# Patient Record
Sex: MALE | Race: WHITE | NOT HISPANIC OR LATINO | Employment: OTHER | URBAN - METROPOLITAN AREA
[De-identification: names, ages, dates, MRNs, and addresses within clinical notes are randomized per-mention and may not be internally consistent; named-entity substitution may affect disease eponyms.]

---

## 2023-02-28 LAB
ALANINE AMINOTRANSFERASE (SGPT) (U/L) IN SER/PLAS: 21 U/L (ref 10–52)
ALBUMIN (G/DL) IN SER/PLAS: 4.3 G/DL (ref 3.4–5)
ALKALINE PHOSPHATASE (U/L) IN SER/PLAS: 58 U/L (ref 33–120)
ANION GAP IN SER/PLAS: 12 MMOL/L (ref 10–20)
ASPARTATE AMINOTRANSFERASE (SGOT) (U/L) IN SER/PLAS: 18 U/L (ref 9–39)
BASOPHILS (10*3/UL) IN BLOOD BY AUTOMATED COUNT: 0.1 X10E9/L (ref 0–0.1)
BASOPHILS/100 LEUKOCYTES IN BLOOD BY AUTOMATED COUNT: 0.9 % (ref 0–2)
BILIRUBIN TOTAL (MG/DL) IN SER/PLAS: 0.6 MG/DL (ref 0–1.2)
CALCIDIOL (25 OH VITAMIN D3) (NG/ML) IN SER/PLAS: 36 NG/ML
CALCIUM (MG/DL) IN SER/PLAS: 9.3 MG/DL (ref 8.6–10.3)
CARBON DIOXIDE, TOTAL (MMOL/L) IN SER/PLAS: 27 MMOL/L (ref 21–32)
CHLORIDE (MMOL/L) IN SER/PLAS: 108 MMOL/L (ref 98–107)
CHOLESTEROL (MG/DL) IN SER/PLAS: 156 MG/DL (ref 0–199)
CHOLESTEROL IN HDL (MG/DL) IN SER/PLAS: 39.2 MG/DL
CHOLESTEROL/HDL RATIO: 4
CREATININE (MG/DL) IN SER/PLAS: 0.95 MG/DL (ref 0.5–1.3)
EOSINOPHILS (10*3/UL) IN BLOOD BY AUTOMATED COUNT: 0.62 X10E9/L (ref 0–0.7)
EOSINOPHILS/100 LEUKOCYTES IN BLOOD BY AUTOMATED COUNT: 5.6 % (ref 0–6)
ERYTHROCYTE DISTRIBUTION WIDTH (RATIO) BY AUTOMATED COUNT: 13.6 % (ref 11.5–14.5)
ERYTHROCYTE MEAN CORPUSCULAR HEMOGLOBIN CONCENTRATION (G/DL) BY AUTOMATED: 34.2 G/DL (ref 32–36)
ERYTHROCYTE MEAN CORPUSCULAR VOLUME (FL) BY AUTOMATED COUNT: 87 FL (ref 80–100)
ERYTHROCYTES (10*6/UL) IN BLOOD BY AUTOMATED COUNT: 4.9 X10E12/L (ref 4.5–5.9)
GFR MALE: >90 ML/MIN/1.73M2
GLUCOSE (MG/DL) IN SER/PLAS: 94 MG/DL (ref 74–99)
HEMATOCRIT (%) IN BLOOD BY AUTOMATED COUNT: 42.4 % (ref 41–52)
HEMOGLOBIN (G/DL) IN BLOOD: 14.5 G/DL (ref 13.5–17.5)
IMMATURE GRANULOCYTES/100 LEUKOCYTES IN BLOOD BY AUTOMATED COUNT: 0.9 % (ref 0–0.9)
LDL: 93 MG/DL (ref 0–99)
LEUKOCYTES (10*3/UL) IN BLOOD BY AUTOMATED COUNT: 11.1 X10E9/L (ref 4.4–11.3)
LYMPHOCYTES (10*3/UL) IN BLOOD BY AUTOMATED COUNT: 5.06 X10E9/L (ref 1.2–4.8)
LYMPHOCYTES/100 LEUKOCYTES IN BLOOD BY AUTOMATED COUNT: 45.6 % (ref 13–44)
MONOCYTES (10*3/UL) IN BLOOD BY AUTOMATED COUNT: 0.94 X10E9/L (ref 0.1–1)
MONOCYTES/100 LEUKOCYTES IN BLOOD BY AUTOMATED COUNT: 8.5 % (ref 2–10)
NEUTROPHILS (10*3/UL) IN BLOOD BY AUTOMATED COUNT: 4.28 X10E9/L (ref 1.2–7.7)
NEUTROPHILS/100 LEUKOCYTES IN BLOOD BY AUTOMATED COUNT: 38.5 % (ref 40–80)
PLATELETS (10*3/UL) IN BLOOD AUTOMATED COUNT: 201 X10E9/L (ref 150–450)
POTASSIUM (MMOL/L) IN SER/PLAS: 3.8 MMOL/L (ref 3.5–5.3)
PROSTATE SPECIFIC ANTIGEN,SCREEN: 0.64 NG/ML (ref 0–4)
PROTEIN TOTAL: 6.8 G/DL (ref 6.4–8.2)
SODIUM (MMOL/L) IN SER/PLAS: 143 MMOL/L (ref 136–145)
TRIGLYCERIDE (MG/DL) IN SER/PLAS: 119 MG/DL (ref 0–149)
UREA NITROGEN (MG/DL) IN SER/PLAS: 20 MG/DL (ref 6–23)
VLDL: 24 MG/DL (ref 0–40)

## 2023-03-31 DIAGNOSIS — I10 BENIGN ESSENTIAL HTN: Primary | ICD-10-CM

## 2023-03-31 RX ORDER — AMLODIPINE BESYLATE 5 MG/1
TABLET ORAL
Qty: 30 TABLET | Refills: 1 | Status: SHIPPED | OUTPATIENT
Start: 2023-03-31 | End: 2023-06-23

## 2023-05-22 ENCOUNTER — APPOINTMENT (OUTPATIENT)
Dept: PRIMARY CARE | Facility: CLINIC | Age: 58
End: 2023-05-22
Payer: MEDICARE

## 2023-05-23 PROBLEM — E78.5 HYPERLIPIDEMIA: Status: ACTIVE | Noted: 2023-05-23

## 2023-05-23 PROBLEM — M26.609 TMJ (TEMPOROMANDIBULAR JOINT SYNDROME): Status: ACTIVE | Noted: 2023-05-23

## 2023-05-23 PROBLEM — F34.1 DYSTHYMIC DISORDER: Status: ACTIVE | Noted: 2023-05-23

## 2023-05-23 PROBLEM — R35.0 URINARY FREQUENCY: Status: ACTIVE | Noted: 2023-05-23

## 2023-05-23 PROBLEM — N32.89 BLADDER WALL THICKENING: Status: ACTIVE | Noted: 2023-05-23

## 2023-05-23 PROBLEM — H10.9 CONJUNCTIVITIS: Status: ACTIVE | Noted: 2023-05-23

## 2023-05-23 PROBLEM — F33.1 MAJOR DEPRESSIVE DISORDER, RECURRENT EPISODE, MODERATE DEGREE (MULTI): Status: ACTIVE | Noted: 2023-05-23

## 2023-05-23 PROBLEM — I10 BENIGN ESSENTIAL HYPERTENSION: Status: ACTIVE | Noted: 2023-05-23

## 2023-05-23 PROBLEM — M25.552 HIP PAIN, LEFT: Status: ACTIVE | Noted: 2023-05-23

## 2023-05-23 PROBLEM — K59.09 CHRONIC CONSTIPATION: Status: ACTIVE | Noted: 2023-05-23

## 2023-05-23 PROBLEM — C91.10 CLL (CHRONIC LYMPHOCYTIC LEUKEMIA) (MULTI): Status: ACTIVE | Noted: 2023-05-23

## 2023-05-23 PROBLEM — F98.4 REPETITIVE STEREOTYPED MOVEMENT: Status: ACTIVE | Noted: 2023-05-23

## 2023-05-23 PROBLEM — J32.9 SINUSITIS: Status: ACTIVE | Noted: 2023-05-23

## 2023-05-23 PROBLEM — D72.829 LEUKOCYTOSIS: Status: ACTIVE | Noted: 2023-05-23

## 2023-05-23 PROBLEM — F29 PSYCHOSIS (MULTI): Status: ACTIVE | Noted: 2023-05-23

## 2023-05-23 PROBLEM — R79.89 ABNORMAL CBC: Status: ACTIVE | Noted: 2023-05-23

## 2023-05-23 PROBLEM — N40.0 BPH WITHOUT URINARY OBSTRUCTION: Status: ACTIVE | Noted: 2023-05-23

## 2023-05-23 PROBLEM — F84.0 AUTISM SPECTRUM DISORDER (HHS-HCC): Status: ACTIVE | Noted: 2023-05-23

## 2023-05-23 PROBLEM — K21.9 GASTROESOPHAGEAL REFLUX DISEASE: Status: ACTIVE | Noted: 2023-05-23

## 2023-05-23 PROBLEM — G47.00 INSOMNIA: Status: ACTIVE | Noted: 2023-05-23

## 2023-05-23 PROBLEM — R39.9 UTI SYMPTOMS: Status: ACTIVE | Noted: 2023-05-23

## 2023-05-23 PROBLEM — Q64.79 STRUCTURAL ABNORMALITY OF BLADDER: Status: ACTIVE | Noted: 2023-05-23

## 2023-05-23 PROBLEM — R63.4 UNINTENTIONAL WEIGHT LOSS: Status: ACTIVE | Noted: 2023-05-23

## 2023-05-23 PROBLEM — E55.9 VITAMIN D DEFICIENCY: Status: ACTIVE | Noted: 2023-05-23

## 2023-05-23 PROBLEM — F71 MODERATE INTELLECTUAL DISABILITY: Status: ACTIVE | Noted: 2023-05-23

## 2023-05-26 PROBLEM — R91.1 LUNG NODULE: Status: ACTIVE | Noted: 2023-05-26

## 2023-05-26 PROBLEM — R21 RASH: Status: ACTIVE | Noted: 2023-05-26

## 2023-05-30 ENCOUNTER — OFFICE VISIT (OUTPATIENT)
Dept: PRIMARY CARE | Facility: CLINIC | Age: 58
End: 2023-05-30
Payer: MEDICARE

## 2023-05-30 VITALS
WEIGHT: 190.8 LBS | DIASTOLIC BLOOD PRESSURE: 68 MMHG | HEART RATE: 76 BPM | SYSTOLIC BLOOD PRESSURE: 108 MMHG | HEIGHT: 67 IN | BODY MASS INDEX: 29.95 KG/M2 | OXYGEN SATURATION: 94 % | TEMPERATURE: 97.6 F

## 2023-05-30 DIAGNOSIS — K21.9 GASTROESOPHAGEAL REFLUX DISEASE, UNSPECIFIED WHETHER ESOPHAGITIS PRESENT: ICD-10-CM

## 2023-05-30 DIAGNOSIS — G89.29 CHRONIC RIGHT SHOULDER PAIN: Primary | ICD-10-CM

## 2023-05-30 DIAGNOSIS — I10 BENIGN ESSENTIAL HYPERTENSION: ICD-10-CM

## 2023-05-30 DIAGNOSIS — K59.09 CHRONIC CONSTIPATION: ICD-10-CM

## 2023-05-30 DIAGNOSIS — R91.1 LUNG NODULE: ICD-10-CM

## 2023-05-30 DIAGNOSIS — E55.9 VITAMIN D DEFICIENCY: ICD-10-CM

## 2023-05-30 DIAGNOSIS — E78.5 HYPERLIPIDEMIA, UNSPECIFIED HYPERLIPIDEMIA TYPE: ICD-10-CM

## 2023-05-30 DIAGNOSIS — G47.00 INSOMNIA, UNSPECIFIED TYPE: ICD-10-CM

## 2023-05-30 DIAGNOSIS — F29 PSYCHOSIS, UNSPECIFIED PSYCHOSIS TYPE (MULTI): ICD-10-CM

## 2023-05-30 DIAGNOSIS — M25.511 CHRONIC RIGHT SHOULDER PAIN: Primary | ICD-10-CM

## 2023-05-30 DIAGNOSIS — R21 RASH: ICD-10-CM

## 2023-05-30 DIAGNOSIS — F84.0 AUTISM SPECTRUM DISORDER (HHS-HCC): ICD-10-CM

## 2023-05-30 DIAGNOSIS — F33.1 MAJOR DEPRESSIVE DISORDER, RECURRENT EPISODE, MODERATE DEGREE (MULTI): ICD-10-CM

## 2023-05-30 DIAGNOSIS — F71 MODERATE INTELLECTUAL DISABILITY: ICD-10-CM

## 2023-05-30 DIAGNOSIS — N40.0 BPH WITHOUT URINARY OBSTRUCTION: ICD-10-CM

## 2023-05-30 PROBLEM — K02.9 CHRONIC DENTAL CARIES EXTENDING TO PULP: Status: ACTIVE | Noted: 2020-01-13

## 2023-05-30 PROCEDURE — 3074F SYST BP LT 130 MM HG: CPT | Performed by: STUDENT IN AN ORGANIZED HEALTH CARE EDUCATION/TRAINING PROGRAM

## 2023-05-30 PROCEDURE — 1036F TOBACCO NON-USER: CPT | Performed by: STUDENT IN AN ORGANIZED HEALTH CARE EDUCATION/TRAINING PROGRAM

## 2023-05-30 PROCEDURE — 3078F DIAST BP <80 MM HG: CPT | Performed by: STUDENT IN AN ORGANIZED HEALTH CARE EDUCATION/TRAINING PROGRAM

## 2023-05-30 PROCEDURE — 99214 OFFICE O/P EST MOD 30 MIN: CPT | Performed by: STUDENT IN AN ORGANIZED HEALTH CARE EDUCATION/TRAINING PROGRAM

## 2023-05-30 RX ORDER — LATANOPROST 50 UG/ML
1 SOLUTION/ DROPS OPHTHALMIC
COMMUNITY

## 2023-05-30 RX ORDER — SIMVASTATIN 20 MG/1
20 TABLET, FILM COATED ORAL DAILY
COMMUNITY
Start: 2008-05-13 | End: 2023-12-10

## 2023-05-30 RX ORDER — CHOLECALCIFEROL (VITAMIN D3) 25 MCG
1000 TABLET ORAL
COMMUNITY
End: 2023-07-20

## 2023-05-30 RX ORDER — POLYETHYLENE GLYCOL 3350 17 G/17G
17 POWDER, FOR SOLUTION ORAL
COMMUNITY
Start: 2022-03-09 | End: 2023-10-10 | Stop reason: SDUPTHER

## 2023-05-30 RX ORDER — TRAZODONE HYDROCHLORIDE 100 MG/1
100 TABLET ORAL NIGHTLY
COMMUNITY
Start: 2020-10-06 | End: 2024-02-05 | Stop reason: SDUPTHER

## 2023-05-30 RX ORDER — ACETAMINOPHEN 500 MG
5 TABLET ORAL
COMMUNITY
Start: 2016-05-23 | End: 2024-02-05 | Stop reason: SDUPTHER

## 2023-05-30 RX ORDER — ERYTHROMYCIN 5 MG/G
OINTMENT OPHTHALMIC
COMMUNITY

## 2023-05-30 RX ORDER — DORZOLAMIDE HYDROCHLORIDE AND TIMOLOL MALEATE 20; 5 MG/ML; MG/ML
1 SOLUTION/ DROPS OPHTHALMIC
COMMUNITY
Start: 2023-05-26

## 2023-05-30 RX ORDER — OMEPRAZOLE 40 MG/1
40 CAPSULE, DELAYED RELEASE ORAL
COMMUNITY
Start: 2010-05-11

## 2023-05-30 RX ORDER — METOPROLOL TARTRATE 50 MG/1
50 TABLET ORAL
COMMUNITY
Start: 2007-04-03 | End: 2023-10-20

## 2023-05-30 RX ORDER — OLANZAPINE 15 MG/1
15 TABLET ORAL NIGHTLY
COMMUNITY
Start: 2023-03-02 | End: 2023-10-11 | Stop reason: SDUPTHER

## 2023-05-30 RX ORDER — TAMSULOSIN HYDROCHLORIDE 0.4 MG/1
0.4 CAPSULE ORAL DAILY
COMMUNITY
Start: 2020-11-18 | End: 2024-01-05 | Stop reason: SDUPTHER

## 2023-05-30 ASSESSMENT — ENCOUNTER SYMPTOMS
VOMITING: 0
DIAPHORESIS: 0
CHILLS: 0
NAUSEA: 0
LIGHT-HEADEDNESS: 0
SHORTNESS OF BREATH: 0
FEVER: 0
DYSURIA: 0
DIARRHEA: 0
ABDOMINAL PAIN: 0
DIZZINESS: 0

## 2023-05-30 ASSESSMENT — PATIENT HEALTH QUESTIONNAIRE - PHQ9
2. FEELING DOWN, DEPRESSED OR HOPELESS: NOT AT ALL
SUM OF ALL RESPONSES TO PHQ9 QUESTIONS 1 AND 2: 0
1. LITTLE INTEREST OR PLEASURE IN DOING THINGS: NOT AT ALL

## 2023-05-30 NOTE — PROGRESS NOTES
"Subjective   Patient ID: Ede Zurita is a 57 y.o. male who presents for Follow-up.  He is having right shoulder pain for the last few weeks. No injury or falls recently. Feels like a sharp needle pain. Pain is worse with leaning on arm in bed and changing shirts. Crossing arm over chest worsens the pain. Steroids help the pain some.         Review of Systems   Constitutional:  Negative for chills, diaphoresis and fever.   HENT:  Negative for hearing loss.    Eyes:  Negative for visual disturbance.   Respiratory:  Negative for shortness of breath.    Cardiovascular:  Negative for chest pain.   Gastrointestinal:  Negative for abdominal pain, diarrhea, nausea and vomiting.   Genitourinary:  Negative for dysuria.   Neurological:  Negative for dizziness and light-headedness.       /68   Pulse 76   Temp 36.4 °C (97.6 °F)   Ht 1.689 m (5' 6.5\")   Wt 86.5 kg (190 lb 12.8 oz)   SpO2 94%   BMI 30.33 kg/m²     Objective   Physical Exam  Vitals reviewed.   Constitutional:       General: He is not in acute distress.     Appearance: Normal appearance.   HENT:      Head: Normocephalic.   Cardiovascular:      Rate and Rhythm: Normal rate and regular rhythm.   Pulmonary:      Effort: Pulmonary effort is normal. No respiratory distress.      Breath sounds: Normal breath sounds.   Abdominal:      General: There is no distension.   Musculoskeletal:         General: No deformity.      Comments: Right shoulder pain worse with crossing arm over chest. Patient unable to complete apley scratch test on right due to pain.    Skin:     Coloration: Skin is not jaundiced.   Neurological:      Mental Status: He is alert.         Assessment/Plan   Problem List Items Addressed This Visit          Nervous    Insomnia       Respiratory    Lung nodule       Circulatory    Benign essential hypertension       Digestive    Chronic constipation    Gastroesophageal reflux disease       Genitourinary    BPH without urinary obstruction       " Endocrine/Metabolic    Vitamin D deficiency       Other    Autism spectrum disorder    Hyperlipidemia    Major depressive disorder, recurrent episode, moderate degree (CMS/HCC)    Moderate intellectual disability    Psychosis (CMS/HCC)    Rash     Other Visit Diagnoses       Chronic right shoulder pain    -  Primary    Relevant Orders    Referral to Orthopaedic Surgery          Shoulder pain: Possible AC joint issue, referred to ortho    Rash: trial steroid cream x 2 weeks and resolved     Constipation: on colace  -seeing GI     Unintentional weight loss: new diagnosis of CLL  -seeing heme and having workup--was advised not from CLL  -had recent c-scope, CT head, CT C/A/P  -weight improved, still using boost.  -seeing urology for bladder abnormality--advised continue f/u with urology     Lung nodule, 5mm on CT , may need repeat this fall.     left hip pain: neg and resolved     Hypertriglyceridemia: Improved on lipid panel in      GERD: resolved with stopping ASA and higher PPI  -they saw [facility] had normal EGD recently  -avoid nsaids     Urinary urgency: UA normal  -PSA WNL  -on flomax  -Continue f/u with urology     Hypertension: controlled with norvasc and metoprolol     Hyperlipidemia: on simvastatin, TG getting better (lipids done )     Moderate intellectual disability/dysthymic disorder/ ASD/stereotypic movement disorder  -sees / Angel  -on trazodone, olanzapine, melatonin     Insomnia: see above     Hx of gastric polyps on EGD 5/3/19     f/u 6 months Dr. Sherman.  ENT: Dr. Rowe  Eye doc: Dr. Tomas  GI: Dr. Saroj Sun     Health Maintenance  -Prostate Cancer Screenin/22 WNL  -Colonoscopy:3/15/22-repeat 5 years ([facility])  -Vaccinations: Advised flu shot--they think had but waiting to hear from house. Pneumovax UTD. UTD Covid and shingrix     Lives at [facility]

## 2023-06-29 ENCOUNTER — OFFICE VISIT (OUTPATIENT)
Dept: PRIMARY CARE | Facility: CLINIC | Age: 58
End: 2023-06-29
Payer: MEDICARE

## 2023-06-29 VITALS
TEMPERATURE: 97.9 F | HEART RATE: 75 BPM | HEIGHT: 67 IN | WEIGHT: 195 LBS | OXYGEN SATURATION: 95 % | DIASTOLIC BLOOD PRESSURE: 69 MMHG | SYSTOLIC BLOOD PRESSURE: 103 MMHG | BODY MASS INDEX: 30.61 KG/M2

## 2023-06-29 DIAGNOSIS — L03.211 CELLULITIS, FACE: Primary | ICD-10-CM

## 2023-06-29 PROBLEM — F41.9 ANXIETY: Status: ACTIVE | Noted: 2019-08-09

## 2023-06-29 PROBLEM — M25.561 ACUTE PAIN OF RIGHT KNEE: Status: ACTIVE | Noted: 2019-04-19

## 2023-06-29 PROBLEM — F89 DEVELOPMENTAL DISABILITY: Status: ACTIVE | Noted: 2020-05-15

## 2023-06-29 PROBLEM — I10 HYPERTENSION: Status: ACTIVE | Noted: 2020-05-15

## 2023-06-29 PROBLEM — S16.1XXA CERVICAL STRAIN: Status: ACTIVE | Noted: 2019-02-27

## 2023-06-29 PROBLEM — F98.4 STEREOTYPIC MOVEMENT DISORDER: Status: ACTIVE | Noted: 2019-04-19

## 2023-06-29 PROBLEM — L30.9 ECZEMA: Status: ACTIVE | Noted: 2019-04-19

## 2023-06-29 PROBLEM — M54.2 NECK PAIN: Status: ACTIVE | Noted: 2019-02-27

## 2023-06-29 PROCEDURE — 1036F TOBACCO NON-USER: CPT | Performed by: STUDENT IN AN ORGANIZED HEALTH CARE EDUCATION/TRAINING PROGRAM

## 2023-06-29 PROCEDURE — 3074F SYST BP LT 130 MM HG: CPT | Performed by: STUDENT IN AN ORGANIZED HEALTH CARE EDUCATION/TRAINING PROGRAM

## 2023-06-29 PROCEDURE — 3078F DIAST BP <80 MM HG: CPT | Performed by: STUDENT IN AN ORGANIZED HEALTH CARE EDUCATION/TRAINING PROGRAM

## 2023-06-29 PROCEDURE — 99213 OFFICE O/P EST LOW 20 MIN: CPT | Performed by: STUDENT IN AN ORGANIZED HEALTH CARE EDUCATION/TRAINING PROGRAM

## 2023-06-29 RX ORDER — CEPHALEXIN 500 MG/1
500 CAPSULE ORAL 4 TIMES DAILY
Qty: 28 CAPSULE | Refills: 0 | Status: SHIPPED | OUTPATIENT
Start: 2023-06-29 | End: 2023-07-06

## 2023-06-29 RX ORDER — CEPHALEXIN 500 MG/1
500 CAPSULE ORAL 4 TIMES DAILY
Qty: 28 CAPSULE | Refills: 0 | Status: SHIPPED | OUTPATIENT
Start: 2023-06-29 | End: 2023-06-29 | Stop reason: SDUPTHER

## 2023-06-29 ASSESSMENT — ENCOUNTER SYMPTOMS
VOMITING: 0
SHORTNESS OF BREATH: 0
DIZZINESS: 0
CHILLS: 0
NAUSEA: 0
EYE PAIN: 0
LIGHT-HEADEDNESS: 0
DIAPHORESIS: 0
FEVER: 0

## 2023-06-29 NOTE — ASSESSMENT & PLAN NOTE
Symptoms most consistent with right face cellulitis.  Will empirically treat with another round of Keflex.  Derm if no better.  No signs of periorbital or orbital cellulitis.  Advised to caregiver to let us know if rash is worsening, rash close to the eyes as may need ER evaluation.  Follow-up with Dr. Sherman as scheduled later this year or sooner if needed.

## 2023-06-29 NOTE — PROGRESS NOTES
"Subjective   Patient ID: Ede Zurita is a 57 y.o. male who presents for Rash and Leg Swelling.  He is here for rash and leg swelling. Here with Lynette who is a residential coordination. Rash was on the right side of the face and slightly erythematous but spread throughout. It is patchy on the right side of the face. He went to [facility] urgent care for this and prescribed bacitracin and keflex x1 week for suspected skin infection. Rash seemed to improve but then worsened. He tends to rub soft things on his face such as pollen. Rash present for 1 month, improved with abx but then recurred and seems to be slightly improving today. Rash is not painful and is itchy.        Review of Systems   Constitutional:  Negative for chills, diaphoresis and fever.   HENT:  Negative for ear pain and hearing loss.    Eyes:  Negative for pain and visual disturbance.   Respiratory:  Negative for shortness of breath.    Gastrointestinal:  Negative for nausea and vomiting.   Neurological:  Negative for dizziness and light-headedness.       /69   Pulse 75   Temp 36.6 °C (97.9 °F)   Ht 1.689 m (5' 6.5\")   Wt 88.5 kg (195 lb)   SpO2 95%   BMI 31.00 kg/m²     Objective   Physical Exam  Vitals reviewed.   Constitutional:       General: He is not in acute distress.     Appearance: Normal appearance.   HENT:      Head: Normocephalic.   Eyes:      Extraocular Movements: Extraocular movements intact.   Cardiovascular:      Rate and Rhythm: Normal rate and regular rhythm.   Pulmonary:      Effort: Pulmonary effort is normal. No respiratory distress.      Breath sounds: Normal breath sounds.   Abdominal:      General: There is no distension.   Musculoskeletal:         General: No deformity.   Skin:     Coloration: Skin is not jaundiced.      Comments: Mild erythema present over the right anterior/ temporal face approximately 6 to 8 cm in diameter, not warm, no crepitus.  No erythema around the eyes bilaterally.  No scaling, no vesicles " present.   Neurological:      Mental Status: He is alert.         Assessment/Plan   Problem List Items Addressed This Visit       Cellulitis, face - Primary     Symptoms most consistent with right face cellulitis.  Will empirically treat with another round of Keflex.  Derm if no better.  No signs of periorbital or orbital cellulitis.  Advised to caregiver to let us know if rash is worsening, rash close to the eyes as may need ER evaluation.  Follow-up with Dr. Sherman as scheduled later this year or sooner if needed.         Relevant Medications    cephalexin (Keflex) 500 mg capsule

## 2023-07-20 DIAGNOSIS — E55.9 VITAMIN D DEFICIENCY: Primary | ICD-10-CM

## 2023-07-20 DIAGNOSIS — F33.1 MAJOR DEPRESSIVE DISORDER, RECURRENT EPISODE, MODERATE DEGREE (MULTI): Primary | ICD-10-CM

## 2023-07-20 RX ORDER — CHOLECALCIFEROL (VITAMIN D3) 25 MCG
25 TABLET ORAL DAILY
Qty: 30 TABLET | Refills: 1 | Status: SHIPPED | OUTPATIENT
Start: 2023-07-20 | End: 2023-10-13

## 2023-07-20 RX ORDER — ASPIRIN 325 MG
1 TABLET ORAL DAILY
Qty: 30 EACH | Refills: 4 | Status: SHIPPED | OUTPATIENT
Start: 2023-07-20 | End: 2024-01-04

## 2023-08-14 ENCOUNTER — TELEPHONE (OUTPATIENT)
Dept: PRIMARY CARE | Facility: CLINIC | Age: 58
End: 2023-08-14
Payer: COMMERCIAL

## 2023-08-14 NOTE — TELEPHONE ENCOUNTER
Trish called and states that they need to know if pt is taking mirilax twice day or once daily.    They believe it is supposed to be once a daily but need to make sure.    Please advise.

## 2023-08-18 DIAGNOSIS — I10 BENIGN ESSENTIAL HTN: ICD-10-CM

## 2023-08-18 RX ORDER — AMLODIPINE BESYLATE 5 MG/1
TABLET ORAL
Qty: 30 TABLET | Refills: 10 | Status: SHIPPED | OUTPATIENT
Start: 2023-08-18

## 2023-10-06 DIAGNOSIS — F29 PSYCHOSIS, UNSPECIFIED PSYCHOSIS TYPE (MULTI): ICD-10-CM

## 2023-10-09 ENCOUNTER — TELEPHONE (OUTPATIENT)
Dept: OTHER | Age: 58
End: 2023-10-09
Payer: COMMERCIAL

## 2023-10-10 ENCOUNTER — TELEPHONE (OUTPATIENT)
Dept: PRIMARY CARE | Facility: CLINIC | Age: 58
End: 2023-10-10
Payer: COMMERCIAL

## 2023-10-10 DIAGNOSIS — K59.00 CONSTIPATION, UNSPECIFIED CONSTIPATION TYPE: Primary | ICD-10-CM

## 2023-10-10 RX ORDER — POLYETHYLENE GLYCOL 3350 17 G/17G
17 POWDER, FOR SOLUTION ORAL DAILY
Qty: 510 G | Refills: 11 | Status: SHIPPED | OUTPATIENT
Start: 2023-10-10 | End: 2024-10-04

## 2023-10-10 NOTE — TELEPHONE ENCOUNTER
Trish with [facility] called and was requesting a refill on Miralax. She stated that she was unsure if this was last prescribed by you. Please advise - accuscripts

## 2023-10-11 ENCOUNTER — TELEPHONE (OUTPATIENT)
Dept: BEHAVIORAL HEALTH | Facility: CLINIC | Age: 58
End: 2023-10-11
Payer: COMMERCIAL

## 2023-10-11 DIAGNOSIS — F29 PSYCHOSIS, UNSPECIFIED PSYCHOSIS TYPE (MULTI): ICD-10-CM

## 2023-10-11 RX ORDER — OLANZAPINE 15 MG/1
15 TABLET ORAL NIGHTLY
Qty: 30 TABLET | Refills: 3 | Status: SHIPPED | OUTPATIENT
Start: 2023-10-11 | End: 2024-05-09 | Stop reason: WASHOUT

## 2023-10-13 DIAGNOSIS — K59.09 CHRONIC CONSTIPATION: Primary | ICD-10-CM

## 2023-10-13 DIAGNOSIS — F41.1 GAD (GENERALIZED ANXIETY DISORDER): ICD-10-CM

## 2023-10-13 DIAGNOSIS — E55.9 VITAMIN D DEFICIENCY: ICD-10-CM

## 2023-10-13 RX ORDER — DOCUSATE SODIUM 100 MG/1
CAPSULE, LIQUID FILLED ORAL
Qty: 30 CAPSULE | Refills: 10 | Status: SHIPPED | OUTPATIENT
Start: 2023-10-13

## 2023-10-13 RX ORDER — CHOLECALCIFEROL (VITAMIN D3) 25 MCG
25 TABLET ORAL DAILY
Qty: 30 TABLET | Refills: 10 | Status: SHIPPED | OUTPATIENT
Start: 2023-10-13

## 2023-10-20 RX ORDER — ESCITALOPRAM OXALATE 10 MG/1
TABLET ORAL
Qty: 30 TABLET | Refills: 10 | Status: SHIPPED | OUTPATIENT
Start: 2023-10-20 | End: 2023-11-13 | Stop reason: SDUPTHER

## 2023-10-20 RX ORDER — OLANZAPINE 15 MG/1
15 TABLET ORAL NIGHTLY
Qty: 30 TABLET | Refills: 6 | Status: SHIPPED | OUTPATIENT
Start: 2023-10-20 | End: 2024-02-05 | Stop reason: SDUPTHER

## 2023-10-20 RX ORDER — METOPROLOL TARTRATE 50 MG/1
TABLET ORAL
Qty: 60 TABLET | Refills: 10 | Status: SHIPPED | OUTPATIENT
Start: 2023-10-20 | End: 2024-02-05 | Stop reason: SDUPTHER

## 2023-10-30 ENCOUNTER — APPOINTMENT (OUTPATIENT)
Dept: BEHAVIORAL HEALTH | Facility: CLINIC | Age: 58
End: 2023-10-30
Payer: COMMERCIAL

## 2023-11-09 ENCOUNTER — LAB (OUTPATIENT)
Dept: LAB | Facility: LAB | Age: 58
End: 2023-11-09
Payer: MEDICARE

## 2023-11-09 ENCOUNTER — OFFICE VISIT (OUTPATIENT)
Dept: PRIMARY CARE | Facility: CLINIC | Age: 58
End: 2023-11-09
Payer: MEDICARE

## 2023-11-09 VITALS
HEIGHT: 67 IN | OXYGEN SATURATION: 93 % | WEIGHT: 206.6 LBS | BODY MASS INDEX: 32.43 KG/M2 | DIASTOLIC BLOOD PRESSURE: 64 MMHG | TEMPERATURE: 97.7 F | HEART RATE: 72 BPM | SYSTOLIC BLOOD PRESSURE: 98 MMHG

## 2023-11-09 DIAGNOSIS — Z12.5 SCREENING FOR PROSTATE CANCER: ICD-10-CM

## 2023-11-09 DIAGNOSIS — L71.0 PERIORAL DERMATITIS: ICD-10-CM

## 2023-11-09 DIAGNOSIS — E78.5 HYPERLIPIDEMIA, UNSPECIFIED HYPERLIPIDEMIA TYPE: ICD-10-CM

## 2023-11-09 DIAGNOSIS — R91.1 LUNG NODULE: ICD-10-CM

## 2023-11-09 DIAGNOSIS — Z00.00 ROUTINE GENERAL MEDICAL EXAMINATION AT HEALTH CARE FACILITY: Primary | ICD-10-CM

## 2023-11-09 DIAGNOSIS — R39.9 UTI SYMPTOMS: ICD-10-CM

## 2023-11-09 DIAGNOSIS — I10 BENIGN ESSENTIAL HYPERTENSION: ICD-10-CM

## 2023-11-09 DIAGNOSIS — C91.10 CLL (CHRONIC LYMPHOCYTIC LEUKEMIA) (MULTI): ICD-10-CM

## 2023-11-09 PROBLEM — L03.211 CELLULITIS, FACE: Status: RESOLVED | Noted: 2023-06-29 | Resolved: 2023-11-09

## 2023-11-09 PROBLEM — H10.9 CONJUNCTIVITIS: Status: RESOLVED | Noted: 2023-05-23 | Resolved: 2023-11-09

## 2023-11-09 PROBLEM — R63.4 UNINTENTIONAL WEIGHT LOSS: Status: RESOLVED | Noted: 2023-05-23 | Resolved: 2023-11-09

## 2023-11-09 PROBLEM — F34.1 DYSTHYMIC DISORDER: Status: RESOLVED | Noted: 2023-05-23 | Resolved: 2023-11-09

## 2023-11-09 PROBLEM — J32.9 SINUSITIS: Status: RESOLVED | Noted: 2023-05-23 | Resolved: 2023-11-09

## 2023-11-09 PROBLEM — L03.211 CELLULITIS DIFFUSE, FACE: Status: RESOLVED | Noted: 2023-06-29 | Resolved: 2023-11-09

## 2023-11-09 LAB
APPEARANCE UR: CLEAR
BILIRUB UR STRIP.AUTO-MCNC: NEGATIVE MG/DL
COLOR UR: YELLOW
GLUCOSE UR STRIP.AUTO-MCNC: NEGATIVE MG/DL
KETONES UR STRIP.AUTO-MCNC: NEGATIVE MG/DL
LEUKOCYTE ESTERASE UR QL STRIP.AUTO: NEGATIVE
NITRITE UR QL STRIP.AUTO: NEGATIVE
PH UR STRIP.AUTO: 6 [PH]
PROT UR STRIP.AUTO-MCNC: NEGATIVE MG/DL
RBC # UR STRIP.AUTO: NEGATIVE /UL
SP GR UR STRIP.AUTO: 1.02
UROBILINOGEN UR STRIP.AUTO-MCNC: <2 MG/DL

## 2023-11-09 PROCEDURE — G0439 PPPS, SUBSEQ VISIT: HCPCS | Performed by: INTERNAL MEDICINE

## 2023-11-09 PROCEDURE — 99213 OFFICE O/P EST LOW 20 MIN: CPT | Performed by: INTERNAL MEDICINE

## 2023-11-09 PROCEDURE — 87086 URINE CULTURE/COLONY COUNT: CPT

## 2023-11-09 PROCEDURE — 3078F DIAST BP <80 MM HG: CPT | Performed by: INTERNAL MEDICINE

## 2023-11-09 PROCEDURE — 3074F SYST BP LT 130 MM HG: CPT | Performed by: INTERNAL MEDICINE

## 2023-11-09 PROCEDURE — 1036F TOBACCO NON-USER: CPT | Performed by: INTERNAL MEDICINE

## 2023-11-09 PROCEDURE — 81003 URINALYSIS AUTO W/O SCOPE: CPT

## 2023-11-09 RX ORDER — PIMECROLIMUS 10 MG/G
CREAM TOPICAL
Qty: 60 G | Refills: 1 | Status: SHIPPED | OUTPATIENT
Start: 2023-11-09

## 2023-11-09 RX ORDER — ACETAMINOPHEN 325 MG/1
650 TABLET ORAL EVERY 6 HOURS PRN
COMMUNITY

## 2023-11-09 ASSESSMENT — ENCOUNTER SYMPTOMS
ABDOMINAL PAIN: 0
SHORTNESS OF BREATH: 0
RHINORRHEA: 0
BLOOD IN STOOL: 0
EYE PAIN: 0
SORE THROAT: 0
WOUND: 0
POLYPHAGIA: 0
CHILLS: 0
MYALGIAS: 0
CHEST TIGHTNESS: 0
POLYDIPSIA: 0
FREQUENCY: 0
WHEEZING: 0
ARTHRALGIAS: 0
UNEXPECTED WEIGHT CHANGE: 0
DIARRHEA: 0
CONSTIPATION: 0
DIZZINESS: 0
COUGH: 0
FEVER: 0
VOMITING: 0
HEADACHES: 0
NERVOUS/ANXIOUS: 0
PALPITATIONS: 0
NAUSEA: 0
DYSPHORIC MOOD: 0
DYSURIA: 0
HEMATURIA: 0

## 2023-11-09 ASSESSMENT — ACTIVITIES OF DAILY LIVING (ADL)
DOING_HOUSEWORK: INDEPENDENT
BATHING: INDEPENDENT
MANAGING_FINANCES: NEEDS ASSISTANCE
GROCERY_SHOPPING: NEEDS ASSISTANCE
DRESSING: INDEPENDENT
TAKING_MEDICATION: NEEDS ASSISTANCE

## 2023-11-09 ASSESSMENT — PATIENT HEALTH QUESTIONNAIRE - PHQ9
SUM OF ALL RESPONSES TO PHQ9 QUESTIONS 1 AND 2: 0
1. LITTLE INTEREST OR PLEASURE IN DOING THINGS: NOT AT ALL
2. FEELING DOWN, DEPRESSED OR HOPELESS: NOT AT ALL

## 2023-11-09 NOTE — PATIENT INSTRUCTIONS
I recommend Ede to see dermatology with [facility] for rash  Start pimecrolimus to rash around mouth.    Ede is due for blood before next visit. It is fasting    He is also due for his lung scan this month. This is ordered    I ordered urine test to make sure there is not a UTI.    Followup 6 months    COVID booster at the pharmacy

## 2023-11-09 NOTE — PROGRESS NOTES
"Subjective   Reason for Visit: Ede Zurita is an 58 y.o. male here for a Medicare Wellness visit.     Past Medical, Surgical, and Family History reviewed and updated in chart.    Reviewed all medications by prescribing practitioner or clinical pharmacist (such as prescriptions, OTCs, herbal therapies and supplements) and documented in the medical record.    HPI    Here for wellness  Ede states he feels fine and no concerns  Weight is up  Welcome staff checks BP daily -120-130s/70-80s  No lows at home  Feels well    Concerned for rash around eyes and mouth. Ede states no itching or pain. Saw eye doc and given erythromycin and has not helped much. No oozing    His weight is up    Staff states new strong odor to urine. Started yesterday    Patient Care Team:  Iza Sherman MD as PCP - General  Iza Sherman MD as PCP - [facility] Attributed Provider     Review of Systems   Constitutional:  Negative for chills, fever and unexpected weight change.   HENT:  Negative for congestion, hearing loss, rhinorrhea and sore throat.    Eyes:  Negative for pain and visual disturbance.   Respiratory:  Negative for cough, chest tightness, shortness of breath and wheezing.    Cardiovascular:  Negative for chest pain and palpitations.   Gastrointestinal:  Negative for abdominal pain, blood in stool, constipation, diarrhea, nausea and vomiting.   Endocrine: Negative for cold intolerance, heat intolerance, polydipsia and polyphagia.   Genitourinary:  Negative for dysuria, frequency and hematuria.   Musculoskeletal:  Negative for arthralgias and myalgias.   Skin:  Positive for rash. Negative for wound.   Neurological:  Negative for dizziness, syncope and headaches.   Psychiatric/Behavioral:  Negative for dysphoric mood. The patient is not nervous/anxious.        Objective   Vitals:  BP 98/64   Pulse 72   Temp 36.5 °C (97.7 °F)   Ht 1.689 m (5' 6.5\")   Wt 93.7 kg (206 lb 9.6 oz)   SpO2 93%   BMI 32.85 kg/m²       Physical Exam  Vitals " reviewed.   Constitutional:       Appearance: Normal appearance. He is not ill-appearing.   HENT:      Head: Normocephalic and atraumatic.      Right Ear: Tympanic membrane normal.      Left Ear: Tympanic membrane normal.      Nose: Nose normal.      Mouth/Throat:      Mouth: Mucous membranes are dry.      Pharynx: Oropharynx is clear.   Eyes:      Extraocular Movements: Extraocular movements intact.      Conjunctiva/sclera: Conjunctivae normal.      Pupils: Pupils are equal, round, and reactive to light.   Cardiovascular:      Rate and Rhythm: Normal rate and regular rhythm.   Pulmonary:      Effort: Pulmonary effort is normal.      Breath sounds: Normal breath sounds. No wheezing.   Abdominal:      General: There is no distension.      Palpations: Abdomen is soft. There is no mass.      Tenderness: There is no abdominal tenderness.   Musculoskeletal:         General: No swelling.      Cervical back: Neck supple.   Lymphadenopathy:      Cervical: No cervical adenopathy.   Skin:     Comments: Flaky around eye and mouth with papular erythematous rash   Neurological:      General: No focal deficit present.      Mental Status: He is alert.      Gait: Gait normal.   Psychiatric:         Mood and Affect: Mood normal.         Behavior: Behavior normal.         Thought Content: Thought content normal.         Assessment/Plan   Problem List Items Addressed This Visit       Benign essential hypertension    CLL (chronic lymphocytic leukemia) (CMS/HCC)    Relevant Orders    Comprehensive Metabolic Panel    CBC and Auto Differential    Hyperlipidemia    Relevant Orders    Lipid Panel    3 Month Follow Up In [facility] - PCP    UTI symptoms    Relevant Orders    Urinalysis with Reflex Microscopic    Urine Culture    Lung nodule    Relevant Orders    CT chest wo IV contrast     Other Visit Diagnoses       Routine general medical examination at health care facility    -  Primary    Relevant Orders    1 Year Follow Up In  [facility] - PCP - Wellness Exam    Perioral dermatitis        Relevant Medications    pimecrolimus (Elidel) 1 % cream    Screening for prostate cancer        Relevant Orders    Prostate Spec.Ag,Screen           CPE completed.  Advised to keep a heart healthy, low fat diet with fruits and veggies like Mediterranean diet.  Advised on the importance of exercise and maintaining 150 minutes of exercise per week (30 minutes per day 5 days a week).  Advised on regular eye and dental visits.  Discussed age appropriate cancer screening, immunizations and recommendations given.  Discussed avoiding illicit drugs and tobacco. Advised on appropriate use of alcohol.  Advised to wear seat belt.     Rash: appears to be perioral dermatitis  -send to derm  -trial topical     COnstipation: on colace  -seeing GI     Unintentional weight loss: new diagnosis of CLL  -seeing heme and having workup--was advised not from CLL. 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  -had recent c-scope, CT head, CT C/A/P  -weight issues resolved  6/22-  said as needed     Lung nodule 11/22-last checked  -ordered     left hip pain: neg and resolved     Hypertriglyceridemia: ordered     GERD: resolved with stopping ASA and higher PPI  -they saw [facility] had normal EGD recently  -avoid nsaids     Urinary urgency  -PSA WNL  -on flomax  -sees urology    Urine symptoms-check UA and culture     Hypertension: controlled with norvasc and metoprolol     Hyperlipidemia: on simvastatin, TG getting better (lipids done 2/23     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 Labs February Ordered CT. advised to call if weight loss again. look to maintain now  ENT: Dr. Rowe  Eye doc: Dr. Tomas  GI:   [facility]  -Prostate Cancer Screenin/22 WNL  -Colonoscopy:3/15/22-repeat 5 years ([facility])  -Vaccinations: Pneumovax, tdap, shingrix, flu UTRENETTA   Lives at [facility]

## 2023-11-10 DIAGNOSIS — F41.1 GAD (GENERALIZED ANXIETY DISORDER): ICD-10-CM

## 2023-11-10 LAB — BACTERIA UR CULT: NO GROWTH

## 2023-11-13 DIAGNOSIS — F41.1 GAD (GENERALIZED ANXIETY DISORDER): ICD-10-CM

## 2023-11-13 RX ORDER — ESCITALOPRAM OXALATE 10 MG/1
10 TABLET ORAL NIGHTLY
Qty: 30 TABLET | Refills: 6 | Status: SHIPPED | OUTPATIENT
Start: 2023-11-13 | End: 2024-05-09 | Stop reason: WASHOUT

## 2023-11-15 RX ORDER — ESCITALOPRAM OXALATE 10 MG/1
TABLET ORAL
Qty: 30 TABLET | Refills: 10 | Status: SHIPPED | OUTPATIENT
Start: 2023-11-15 | End: 2024-02-05 | Stop reason: SDUPTHER

## 2023-11-16 ENCOUNTER — TELEPHONE (OUTPATIENT)
Dept: PRIMARY CARE | Facility: CLINIC | Age: 58
End: 2023-11-16
Payer: MEDICARE

## 2023-11-16 NOTE — TELEPHONE ENCOUNTER
Trish called and wanted to know what OTC med/cream they can  for pt since the RX cream is not covered.    Please advise.

## 2023-12-08 DIAGNOSIS — E78.5 HYPERLIPIDEMIA, UNSPECIFIED HYPERLIPIDEMIA TYPE: Primary | ICD-10-CM

## 2023-12-10 RX ORDER — SIMVASTATIN 20 MG/1
20 TABLET, FILM COATED ORAL DAILY
Qty: 30 TABLET | Refills: 10 | Status: SHIPPED | OUTPATIENT
Start: 2023-12-10

## 2023-12-28 ENCOUNTER — TELEPHONE (OUTPATIENT)
Dept: BEHAVIORAL HEALTH | Facility: CLINIC | Age: 58
End: 2023-12-28
Payer: MEDICARE

## 2023-12-28 DIAGNOSIS — F42.2 MIXED OBSESSIONAL THOUGHTS AND ACTS: ICD-10-CM

## 2023-12-28 RX ORDER — ESCITALOPRAM OXALATE 20 MG/1
TABLET ORAL
Status: CANCELLED | OUTPATIENT
Start: 2023-12-28

## 2023-12-28 RX ORDER — ESCITALOPRAM OXALATE 20 MG/1
TABLET ORAL
COMMUNITY
Start: 2023-12-07 | End: 2023-12-28 | Stop reason: SDUPTHER

## 2023-12-28 RX ORDER — ESCITALOPRAM OXALATE 20 MG/1
TABLET ORAL
Qty: 30 TABLET | Refills: 6 | Status: SHIPPED | OUTPATIENT
Start: 2023-12-28 | End: 2024-02-05 | Stop reason: SDUPTHER

## 2024-01-04 DIAGNOSIS — F33.1 MAJOR DEPRESSIVE DISORDER, RECURRENT EPISODE, MODERATE DEGREE (MULTI): ICD-10-CM

## 2024-01-04 RX ORDER — ASPIRIN 325 MG
1 TABLET ORAL DAILY
Qty: 2 EACH | Refills: 10 | Status: SHIPPED | OUTPATIENT
Start: 2024-01-04 | End: 2024-01-05 | Stop reason: SDUPTHER

## 2024-01-05 DIAGNOSIS — F33.1 MAJOR DEPRESSIVE DISORDER, RECURRENT EPISODE, MODERATE DEGREE (MULTI): ICD-10-CM

## 2024-01-05 DIAGNOSIS — N40.0 BPH WITHOUT URINARY OBSTRUCTION: ICD-10-CM

## 2024-01-05 RX ORDER — ASPIRIN 325 MG
1 TABLET ORAL DAILY
Qty: 30 EACH | Refills: 10 | Status: SHIPPED | OUTPATIENT
Start: 2024-01-05

## 2024-01-08 RX ORDER — TAMSULOSIN HYDROCHLORIDE 0.4 MG/1
0.4 CAPSULE ORAL NIGHTLY
Qty: 90 CAPSULE | Refills: 3 | Status: SHIPPED | OUTPATIENT
Start: 2024-01-08 | End: 2024-01-22 | Stop reason: SDUPTHER

## 2024-01-22 ENCOUNTER — OFFICE VISIT (OUTPATIENT)
Dept: UROLOGY | Facility: CLINIC | Age: 59
End: 2024-01-22
Payer: MEDICARE

## 2024-01-22 VITALS
HEIGHT: 67 IN | SYSTOLIC BLOOD PRESSURE: 116 MMHG | DIASTOLIC BLOOD PRESSURE: 73 MMHG | HEART RATE: 82 BPM | TEMPERATURE: 97.9 F | BODY MASS INDEX: 32.8 KG/M2 | WEIGHT: 209 LBS

## 2024-01-22 DIAGNOSIS — N40.0 BPH WITHOUT URINARY OBSTRUCTION: ICD-10-CM

## 2024-01-22 DIAGNOSIS — R32 URINARY INCONTINENCE, UNSPECIFIED TYPE: ICD-10-CM

## 2024-01-22 DIAGNOSIS — N32.89 BLADDER WALL THICKENING: Primary | ICD-10-CM

## 2024-01-22 PROBLEM — Q64.79 STRUCTURAL ABNORMALITY OF BLADDER: Status: RESOLVED | Noted: 2023-05-23 | Resolved: 2024-01-22

## 2024-01-22 PROCEDURE — 3078F DIAST BP <80 MM HG: CPT | Performed by: UROLOGY

## 2024-01-22 PROCEDURE — 3074F SYST BP LT 130 MM HG: CPT | Performed by: UROLOGY

## 2024-01-22 PROCEDURE — 99213 OFFICE O/P EST LOW 20 MIN: CPT | Performed by: UROLOGY

## 2024-01-22 PROCEDURE — 1036F TOBACCO NON-USER: CPT | Performed by: UROLOGY

## 2024-01-22 RX ORDER — DIAPER,BRIEF,ADULT, DISPOSABLE
1 EACH MISCELLANEOUS AS NEEDED
Qty: 96 EACH | Refills: 11 | Status: SHIPPED | OUTPATIENT
Start: 2024-01-22

## 2024-01-22 RX ORDER — TAMSULOSIN HYDROCHLORIDE 0.4 MG/1
0.4 CAPSULE ORAL NIGHTLY
Qty: 90 CAPSULE | Refills: 3 | Status: SHIPPED | OUTPATIENT
Start: 2024-01-22

## 2024-01-22 NOTE — PROGRESS NOTES
PRIOR NOTES  58-year-old male referred to me for an incidental abnormal CT scan of the bladder  Here today with his caretaker, lives in group home  Kindly referred by Dr. Iza Sherman  PMH: Hypertension, hyperlipidemia, intellectual disability, CLL, unexplained weight loss  PSH: Cholecystectomy  May have increased urinary frequency but caretakers at his facility not familiar with any issues  Pt does not report any LUTS but somewhat difficult to tell w/ his disability  CT C/A/P with IV contrast 09/21: Bilateral very small renal lesions too small to characterize likely cysts. No nephrolithiasis. Bladder was decompressed on the scan and not well evaluated  CT done at [facility] abdomen/pelvis 03/22: Bladder wall thickening, under distended, prostate measured at 6.1 x 3.7 cm  PSA 01/22: 0.80  UA negative for blood     FUV 1/23/23 -Per his caregiver, he is waking up 1-2 times a night. Per Ede, he is able to be with a good stream, feels he is emptying his bladder okay.  RBUS 11/29/22 - Grossly normal bladder. PVR 36cc.  PSA 02/23 - 0.64     UPDATED SUBJECTIVE HISTORY  01/22/24 - Pt here with caregiver for LUTS. Needs tamsulosin refill. Caregiver requesting large depends in case of accidents.   NTF 1x.     Past Medical History  He has no past medical history on file.    Surgical History  He has a past surgical history that includes Other surgical history (10/14/2020); Other surgical history (10/14/2020); Other surgical history (10/14/2020); and Other surgical history (10/14/2020).     Social History  He reports that he has never smoked. He has never been exposed to tobacco smoke. He has never used smokeless tobacco. He reports that he does not drink alcohol and does not use drugs.    Family History  No family history on file.     Allergies  Patient has no known allergies.    ROS: 12 system review was completed and is negative with the exception of those signs and symptoms noted in the history of present illness: A 12  "system review was completed and is negative with the exception of those signs and symptoms noted in the history of present illness.     Exam:  General: in NAD, appears stated age  Head: normocephalic, atraumatic  Respiratory: normal effort, no use of accessory muscles  Cardiovascular: no edema noted  Skin: normal turgor, no rashes  Neurologic: grossly intact, oriented to person/place/time  Psychiatric: mode and affect appropriate     Last Recorded Vitals  Blood pressure 116/73, pulse 82, temperature 36.6 °C (97.9 °F), height 1.689 m (5' 6.5\"), weight 94.8 kg (209 lb).    Lab Results   Component Value Date    PSASCREEN 0.64 02/28/2023    CREATININE 0.95 02/28/2023    HGB 14.5 02/28/2023         ASSESSMENT/PLAN:  # Enlarged prostate with lower urinary tract symptoms  -Well-controlled on tamsulosin 0.4 mg nightly, 1 year of refills given  -Nocturnal frequency as appropriate  -Bladder wall thickening secondary to BPH  -Prescription for large depends given    From here, given that he has no active urologic issues and is on Flomax only I recommended he follow-up with Dr. Sherman with return visit to me on an as-needed basis for any new issues    Teodoro Min MD    "

## 2024-02-05 ENCOUNTER — TELEMEDICINE (OUTPATIENT)
Dept: BEHAVIORAL HEALTH | Facility: CLINIC | Age: 59
End: 2024-02-05
Payer: MEDICARE

## 2024-02-05 DIAGNOSIS — F51.05 INSOMNIA DUE TO OTHER MENTAL DISORDER: ICD-10-CM

## 2024-02-05 DIAGNOSIS — F29 PSYCHOSIS, UNSPECIFIED PSYCHOSIS TYPE (MULTI): ICD-10-CM

## 2024-02-05 DIAGNOSIS — Z79.899 ENCOUNTER FOR LONG-TERM (CURRENT) USE OF HIGH-RISK MEDICATION: ICD-10-CM

## 2024-02-05 DIAGNOSIS — F42.2 MIXED OBSESSIONAL THOUGHTS AND ACTS: ICD-10-CM

## 2024-02-05 DIAGNOSIS — F99 INSOMNIA DUE TO OTHER MENTAL DISORDER: ICD-10-CM

## 2024-02-05 DIAGNOSIS — F41.1 GAD (GENERALIZED ANXIETY DISORDER): ICD-10-CM

## 2024-02-05 PROCEDURE — 99215 OFFICE O/P EST HI 40 MIN: CPT | Performed by: PSYCHIATRY & NEUROLOGY

## 2024-02-05 PROCEDURE — 1036F TOBACCO NON-USER: CPT | Performed by: PSYCHIATRY & NEUROLOGY

## 2024-02-05 RX ORDER — TRAZODONE HYDROCHLORIDE 100 MG/1
100 TABLET ORAL NIGHTLY
Qty: 30 TABLET | Refills: 6 | Status: SHIPPED | OUTPATIENT
Start: 2024-02-05

## 2024-02-05 RX ORDER — METOPROLOL TARTRATE 50 MG/1
TABLET ORAL
Qty: 60 TABLET | Refills: 6 | Status: SHIPPED | OUTPATIENT
Start: 2024-02-05

## 2024-02-05 RX ORDER — OLANZAPINE 15 MG/1
15 TABLET ORAL NIGHTLY
Qty: 30 TABLET | Refills: 6 | Status: SHIPPED | OUTPATIENT
Start: 2024-02-05

## 2024-02-05 RX ORDER — ACETAMINOPHEN 500 MG
TABLET ORAL
Qty: 60 TABLET | Refills: 6 | Status: SHIPPED | OUTPATIENT
Start: 2024-02-05

## 2024-02-05 RX ORDER — ESCITALOPRAM OXALATE 10 MG/1
TABLET ORAL
Qty: 30 TABLET | Refills: 6 | Status: SHIPPED | OUTPATIENT
Start: 2024-02-05 | End: 2024-05-09 | Stop reason: WASHOUT

## 2024-02-05 RX ORDER — ESCITALOPRAM OXALATE 20 MG/1
TABLET ORAL
Qty: 30 TABLET | Refills: 6 | Status: SHIPPED | OUTPATIENT
Start: 2024-02-05

## 2024-02-05 NOTE — PROGRESS NOTES
"Patient Discussion/Summary    Both patient and staff caregivers were provided information re medications, including benefits, potential risks, and expected and unexpected side effects.      Provider Impressions      DIAGNOSES            Dysthymic disorder              Major depression recurrent              Autism spectrum disorder              Stereotypic Movement disorder              ID moderate     Psychosis      PRESENT FOR APPOINTMENT                  Patient              Residential Caregiver Eduarda Roblero, knows him for last nearly 3 years    Chief Complaint    An interactive audio and video telecommunication system which permits real time communications between the patient (at the originating site) and provider (at the distant site) was utilized to provide this telehealth service.      \"He has been stable\"     SUBJECTIVE      No change in medication at last visit in May 2023.       At previous visit in February 2023, olanzapine dose was decreased from 20 to 15 mg per day, with dosing at bedtime only. Trazodone also decreased from 200 to 100 mg at bedtime.     No other changes in routine medication since his last visit.     Current medications include:    Amlodipine 5 mg in morning  Docusate Na 100 mg per day  Escitalopram 30 mg per day at bedtime  Glycolax 3350 mg twice per day  Melatonin 10 mg at bedtime  Metoprolol 50 mg twice per day (7 am and 5 pm)   MVI  Olanzapine 15 mg at bedtime  Omeprazole 40 mg one per day  Simvastatin 20 mg per day at bedtime  Tamsulosin 0.4 mg in am  Trazodone 100 mg at bedtime    Vitamin D 1000 units per day    Tobradex ear drops weekly    Lorazepam 1 mg prn before dental exams     He is overall stable (improved) since his last visit.          His primary caregiver again notes no recent major issues of concern.   No major behavioral challenges or meltdowns since last visit.   Has one incident last week when he seemed to be escalating to an outburst, but was able to calm " without a meltdown.   Otherwise, his behavior has been non-agitated and not dangerous.        Continues to sleep well at night.   Sometimes seems to have a urinary accident after waking in the morning, but not otherwise.       Remains wider awake in daytime both at home and at his day program.    Has not been reluctant to attend.   Travels to and from day program with  staff.       Sleeping at night from 10 pm until 7 am most days.   Later on weekend mornings.        Fewer recent tangential or provocative questions lately.     No overt aggression of any kind.  Remains cooperative with routine activities at home.     Occasional out of context laughter;  but does not appear manic.     Pacing less.       There has not been SIB as previously seen. No reported crying spells..     He continues to eat well, all meals and snacks, and he has gained weight since last visit.    No organized exercise.    Eats all meals, and multiple daily snacks.     No recent vomiting, no choking.     No recent talk of death or suicide.     No urinary accidents at night.    Has been asked to wear Attends but has resisted.     He is excited about planned visit (with his brother) to sister in [place] this weekend.  Will be gone for 10 days.    REVIEW OF SYMPTOMS      Recent rash around eyes, seen by Dermatologist and treated with steroid cream, resolved.      No other recent rashes.        No recent reports of shoulder pain.   Was able to raise both arms above head today on request with no apparent pain.       No interim bowel accidents over last 3 months.    However, he sometimes seems to engage in rectal plan, will be found with stool on hands and on walls of bathroom.   Does not appear constipated, and no reported loose stools or diarrhea.     Has been seen in the past by Heme-Onc at . No interim visits. Per their last note, he is no longer thought to have diagnosis of chronic lymphocytic leukemia. His previous weight loss was not thought to  be related to CLL or other blood disease. Had previously lost about 60 pounds over 3 years, but weight has stabilized and increased over the last several months.   Most recent weight is 206 pounds, up from 191 pounds in May 2023.      Interim dental visits with Dr. Jones.      Seen in the past in Urology, surrounding apparent bladder wall thickening. No change in therapy planned. Recommended bladder US in 6 months.   Occasional morning  bladder accidents.     No other interim medical issues or visits.     No interim visits with GI Dr. Sun at [facility].     ENT visit for ear cleaning in interim    No other medical issues in interim.  PCP is Dr. Sherman, had visit in Nov 2023.          PAIN      No recent reports of pain.  No observed limp or difficulty walking lately.                  MEDICAL PROBLEMS      PCP at  is Dr. Sherman              SIDE EFFECTS           Pacing is significantly decreased since last medication changes.   No falls, no active tremor, and no drooling.     CHANGE IN TREATMENT          None at last visit.  On previous visit, both olanzapine and trazodone decreased.              COMPLIANCE           Compliance good.        MENTAL STATUS EVALUATION         Per video contact:.    alert     overall cooperative. He was able to sit for an exam.     neat, well groomed              BEHAVIOR            Little gaze avoidance today    Reciprocal interaction     Spontaneous speech      Not at all agitated during exam.     MEMORY            Remembered 3 of 3 objects shown and then hidden after 10 minutes              SENSORY            Normal              COMMUNICATION           Conversational     Speech still stilted/Robotic; less so.              AFFECT         Slightly flat; not at all irritable today.             MOOD            Not overtly sad              THOUGHT PROCESS          [place]     Perseverative                THOUGHT CONTENT          obsessive                PSYCHOTIC SYMPTOMS          None seen  today during exam, but some self talk               ORIENTATION           x3 knew day of week, and month, and year      CONCENTRATION           Less distracted today       CALCULATION           Single digit addition only       FUND OF KNOWLEDGE          moderate disability        JUDGMENT            posed situations     GAIT             Slow and somewhat rolling gait.    Can rise to stand without apparent pain or dizziness.       Gait seems more normal today              EPS             Did not see tremor or drooling today     AIMS             Did not see any abnormal facial or other movements.       LABS REVIEWED    November 2023:    UA normal    February 2023:    CBC CMP and lipids all normal. Vit D 36. PSA normal     June 2022:    CBC and CMP normal. Folate and B12 levels normal. Vitamin D 42. Escitalopram level 89 (range 21-64 for doses 10-30 mg per day)    January 2022:    CMP, TSH, PSA normal.Vit D 42 (normal)    June 2021:    WBC 12K on repeat CBC. Some immature cells.     Feb 2021:    WBC 14.3, PSA 1.4, glucose (appears fasting) 105, rest of CMP normal. HDL 25, and .     December 2019:    CMP, TSH, PSA, Mg all normal. Hct 40.8; rest of CBC grossly normal. TG elevated. HgbA1C normal at 5.1. Vitamin D 46.     July 2018:    Escitalopram level 110. Range is 21-64 for doses 10-30 mg per day.     December 2017:    CBC, CMP, TSH, HgbA1C all normal. Folate 20, B12 845, Vit D 26.8 (low) Cholesterol normal, TG elevated    November 2016:    CBC and CMP grossly normal. , and Vitamin D low end of range.     March 2016:    CBC normal, CMP normal, TG elevated, TSH normal. Vitamin D low             December 2014:    CBC normal. CMP normal. TG slightly elevated. Gabapentin level 5.2 (4-16).     November 2013, CBC, CMP, TSH, and T4 all normal.     EKG    None in interim    July 2022:    NSR, rate 64 bpm, QTc 412 msec    Feb 2021:    NSR, rate 88 bpm, QTc 423 msec.     December 2019:    NSR, rate 97 beats per  minute. QTc 449 msec.     July 2018:    NSR rate 86 bpm; QTc 442 msec, normal EKG    November 2016:    NSR, QTc 415, possible L atrial enlargment.     March 2016:    NSR Rate 83 bpm, QTc 444 msec.              May 2014:    NSR, rate 75 bpm, QTc 418 msec.     In June 2013, EKG showed NSR, rate 87 bpm. Qtc 442 msec. Possible atrial enlargement.     DEMENTIA SCREEN:    NTG-EDSD completed in Oct 2021 scored 22, and repeated in March 2022 scored 16. Both are in the range of concern for a dementia process. However, active psychosis or other major psychiatric illness can cause false positive results.      ASSESSMENT            Remains overall improved since last visit.     No new mood symptoms.   Remains wider awake through the day, and still sleeping ok at night.     Weight has increased in interim, up > 10 pounds in last 6 months.  Less restless.    Not much pacing.    Little overall agitation.     No interim labs.      Need to update labs and EKG      Based on general improvement, plan no change in medications this visit.     Discussed plan for transition to new psychiatrist surrounding correction of current clinician.  Discussed with caregiver Ms. Roblero.    PLAN             problems treated     f/u requested to prevent relapse     medications renewed/re-ordered     1. All meds stable this visit.   2. Continue to do frequent VS, and forward results to PCP Dr. Sherman as needed   3. Update labs and EKG this visit.  4.  Need to decrease caloric intake, eliminate snacks (particular double snacks)  4.  Likely transition to Dr. Chandrika Bedolla at  [facility]           TREATMENT TYPE           video-based counseling and coordination of care 9921, 50 minutes with > 50% c/c with staff caregivers, addressing s/s of illness, risk/benefits of medications, side effects, and behavioral approaches to illness.       F/UP INTERNAL           6 months.

## 2024-02-19 ENCOUNTER — LAB (OUTPATIENT)
Dept: LAB | Facility: LAB | Age: 59
End: 2024-02-19
Payer: MEDICARE

## 2024-02-19 DIAGNOSIS — C91.10 CLL (CHRONIC LYMPHOCYTIC LEUKEMIA) (MULTI): ICD-10-CM

## 2024-02-19 DIAGNOSIS — E78.5 HYPERLIPIDEMIA, UNSPECIFIED HYPERLIPIDEMIA TYPE: ICD-10-CM

## 2024-02-19 DIAGNOSIS — Z79.899 ENCOUNTER FOR LONG-TERM (CURRENT) USE OF HIGH-RISK MEDICATION: ICD-10-CM

## 2024-02-19 DIAGNOSIS — Z12.5 SCREENING FOR PROSTATE CANCER: ICD-10-CM

## 2024-02-19 LAB
ALBUMIN SERPL BCP-MCNC: 4.3 G/DL (ref 3.4–5)
ALP SERPL-CCNC: 71 U/L (ref 33–120)
ALT SERPL W P-5'-P-CCNC: 34 U/L (ref 10–52)
ANION GAP SERPL CALC-SCNC: 12 MMOL/L (ref 10–20)
AST SERPL W P-5'-P-CCNC: 25 U/L (ref 9–39)
BASOPHILS # BLD MANUAL: 0.16 X10*3/UL (ref 0–0.1)
BASOPHILS NFR BLD MANUAL: 1 %
BILIRUB SERPL-MCNC: 0.8 MG/DL (ref 0–1.2)
BUN SERPL-MCNC: 12 MG/DL (ref 6–23)
CALCIUM SERPL-MCNC: 9 MG/DL (ref 8.6–10.3)
CHLORIDE SERPL-SCNC: 106 MMOL/L (ref 98–107)
CHOLEST SERPL-MCNC: 144 MG/DL (ref 0–199)
CHOLESTEROL/HDL RATIO: 4.7
CO2 SERPL-SCNC: 28 MMOL/L (ref 21–32)
CREAT SERPL-MCNC: 1.02 MG/DL (ref 0.5–1.3)
EGFRCR SERPLBLD CKD-EPI 2021: 86 ML/MIN/1.73M*2
EOSINOPHIL # BLD MANUAL: 0.62 X10*3/UL (ref 0–0.7)
EOSINOPHIL NFR BLD MANUAL: 4 %
ERYTHROCYTE [DISTWIDTH] IN BLOOD BY AUTOMATED COUNT: 21.1 % (ref 11.5–14.5)
EST. AVERAGE GLUCOSE BLD GHB EST-MCNC: 91 MG/DL
GLUCOSE SERPL-MCNC: 89 MG/DL (ref 74–99)
HBA1C MFR BLD: 4.8 %
HCT VFR BLD AUTO: 34.5 % (ref 41–52)
HDLC SERPL-MCNC: 30.4 MG/DL
HGB BLD-MCNC: 14.2 G/DL (ref 13.5–17.5)
IMM GRANULOCYTES # BLD AUTO: 0.25 X10*3/UL (ref 0–0.7)
IMM GRANULOCYTES NFR BLD AUTO: 1.6 % (ref 0–0.9)
LDLC SERPL CALC-MCNC: 72 MG/DL
LYMPHOCYTES # BLD MANUAL: 7.91 X10*3/UL (ref 1.2–4.8)
LYMPHOCYTES NFR BLD MANUAL: 51 %
MCH RBC QN AUTO: 39.8 PG (ref 26–34)
MCHC RBC AUTO-ENTMCNC: 41.2 G/DL (ref 32–36)
MCV RBC AUTO: 97 FL (ref 80–100)
MONOCYTES # BLD MANUAL: 0.93 X10*3/UL (ref 0.1–1)
MONOCYTES NFR BLD MANUAL: 6 %
MYELOCYTES # BLD MANUAL: 0.16 X10*3/UL
MYELOCYTES NFR BLD MANUAL: 1 %
NEUTS SEG # BLD MANUAL: 5.74 X10*3/UL (ref 1.2–7)
NEUTS SEG NFR BLD MANUAL: 37 %
NON HDL CHOLESTEROL: 114 MG/DL (ref 0–149)
NRBC BLD-RTO: 0 /100 WBCS (ref 0–0)
PLATELET # BLD AUTO: 214 X10*3/UL (ref 150–450)
POLYCHROMASIA BLD QL SMEAR: ABNORMAL
POTASSIUM SERPL-SCNC: 3.9 MMOL/L (ref 3.5–5.3)
PROT SERPL-MCNC: 6.4 G/DL (ref 6.4–8.2)
PSA SERPL-MCNC: 0.79 NG/ML
RBC # BLD AUTO: 3.57 X10*6/UL (ref 4.5–5.9)
RBC MORPH BLD: ABNORMAL
SODIUM SERPL-SCNC: 142 MMOL/L (ref 136–145)
TOTAL CELLS COUNTED BLD: 100
TRIGL SERPL-MCNC: 208 MG/DL (ref 0–149)
TSH SERPL-ACNC: 3.07 MIU/L (ref 0.44–3.98)
VLDL: 42 MG/DL (ref 0–40)
WBC # BLD AUTO: 15.5 X10*3/UL (ref 4.4–11.3)

## 2024-02-19 PROCEDURE — 85060 BLOOD SMEAR INTERPRETATION: CPT | Performed by: INTERNAL MEDICINE

## 2024-02-19 PROCEDURE — 85007 BL SMEAR W/DIFF WBC COUNT: CPT

## 2024-02-19 PROCEDURE — 80061 LIPID PANEL: CPT

## 2024-02-19 PROCEDURE — 85027 COMPLETE CBC AUTOMATED: CPT

## 2024-02-19 PROCEDURE — 80053 COMPREHEN METABOLIC PANEL: CPT

## 2024-02-19 PROCEDURE — 84443 ASSAY THYROID STIM HORMONE: CPT

## 2024-02-19 PROCEDURE — G0103 PSA SCREENING: HCPCS

## 2024-02-19 PROCEDURE — 83036 HEMOGLOBIN GLYCOSYLATED A1C: CPT

## 2024-02-20 LAB — PATH REVIEW-CBC DIFFERENTIAL: NORMAL

## 2024-04-02 ENCOUNTER — OFFICE VISIT (OUTPATIENT)
Dept: BEHAVIORAL HEALTH | Facility: CLINIC | Age: 59
End: 2024-04-02
Payer: MEDICARE

## 2024-04-02 DIAGNOSIS — F32.9 MAJOR DEPRESSIVE DISORDER, REMISSION STATUS UNSPECIFIED, UNSPECIFIED WHETHER RECURRENT: ICD-10-CM

## 2024-04-02 DIAGNOSIS — G47.9 SLEEP DISORDER: ICD-10-CM

## 2024-04-02 DIAGNOSIS — Z86.59 PSYCHIATRIC FOLLOW-UP: ICD-10-CM

## 2024-04-02 DIAGNOSIS — F29 PSYCHOSIS, UNSPECIFIED PSYCHOSIS TYPE (MULTI): ICD-10-CM

## 2024-04-02 DIAGNOSIS — F79 INTELLECTUAL DISABILITY: ICD-10-CM

## 2024-04-02 DIAGNOSIS — F98.4 STEREOTYPIC MOVEMENT DISORDER: ICD-10-CM

## 2024-04-02 DIAGNOSIS — Z09 PSYCHIATRIC FOLLOW-UP: ICD-10-CM

## 2024-04-02 DIAGNOSIS — F84.0 AUTISM SPECTRUM DISORDER (HHS-HCC): ICD-10-CM

## 2024-04-02 PROCEDURE — 99215 OFFICE O/P EST HI 40 MIN: CPT | Performed by: STUDENT IN AN ORGANIZED HEALTH CARE EDUCATION/TRAINING PROGRAM

## 2024-04-02 NOTE — PATIENT INSTRUCTIONS
AFTER VISIT SUMMARY      Date: 4/2/2024  Appointment with Psychiatrist - Dr. Bedolla      Reason for Visit:  -New patient - transition of care from Dr. Ortiz      Discussed during Appointment:   -Sleep, fatigue, household disruption  -Physical health, psychiatric/behavioral symptoms, daily functioning, new issues/concerns     -Treatment plan/management, bloodwork  -Medication      Clinical Impression/Status Update:  Patient appears to be psychiatrically and behaviorally stable. Report of sleep disturbance and agitation due to a night time household disruption by his roommate. Also, report of sluggishness in the morning which predates household disruption. Patient has not had a sleep study done in the past.  Will defer sleep study to primary care and follow up with him after his upcoming PCP appointment.  Will continue to monitor symptoms and make no medication changes today.    -Risk/Benefit assessment:   Medical necessity for continued treatment with psychotropic medication:   There is no report of signs/symptoms consistent with medication-induced impairment in daily functioning. At this time, benefits of medication felt to outweigh potential risks. But we will continue to reassess need for psychotropic medication at regular 3 to 6 month intervals, or sooner as clinically indicated.      #Clinic Policies/Procedures  -Refills  Prescriptions will be sent to your pharmacy with enough refills to last until your next appointment. For established patients, we typically provide 6 refills for regular meds and 3 refills for controlled substances (e.g., ADHD stimulant medication, benzodiazepines such as lorazepam). We will not provide additional refills beyond that without having an appointment. You can schedule an appointment by calling our office at [phone].     -Paperwork Requests (e.g., Expert Eval for guardianship)  We ask that you please schedule an appointment if needing paperwork filled out by the doctor  (e.g., Expert Eval, FMLA form).  Please provide as much information as possible about your request and email the doctor any forms needing to be filled out prior to the appointment.       ===========================  INSTRUCTIONS/RECOMMENDATIONS  ===========================    #Medication   -No medication changes made today  --Continue taking your psych medications the same as usual    --Refills will be sent to your pharmacy      >>For prior authorization issues at the pharmacy -> Call the office and ask to talk to Nurse Rangel.      #Technical Issues with Epic -> Please help us improve the Epic experience  To help identify issues needing to be fixed and improve patient care, please report any issues you experience with Epic or [employer], such as difficulty connecting to video during virtual visits.  [phone]      #Follow-up  --Your next appointment is scheduled for 6/10/2024 at 2:00pm (virtual visit with [facility])    *If having new/worsening symptoms, please call the clinic ([phone]) to discuss being seen sooner   >>If unable to reach the office, send me an email at [email or web address]

## 2024-04-02 NOTE — PROGRESS NOTES
Others Attending Appointment:  -Staff (Joseline)  *Presence necessary as independent historian given patient's intellectual/developmental disability and/or impaired communication abilities        CHIEF COMPLAINT     -New patient evaluation   --For transition of care - from Dr. Ortiz        HISTORY OF PRESENT ILLNESS    #Interval Change/Main concerns reported  Report of sleep disturbance in the setting of household disruption. Otherwise no report of new issues/concerns.        #Sleep  Report of sleep disturbance in the setting of his roommate that causes disruption at night interrupting his sleep. Disruption usually starts between 2 and 4 am on most nights. Patient goes to bed around 10 pm. Without the disruption, patient will usually wake up around 6.30 am. Staff reports that sleep disturbance results in 'agitation' defined by staff as yelling at roommate. Staff reports that prior to sleep being disrupted, patient had tendency for morning fatigue and episodes of incontinence. Has been noted on past visits. Per staff knowledge, has never had a sleep study. Patient reports that he takes naps at the day program but unclear how often and in what duration.       #Mood/Irritability  -Patient reports that he is generally in a happy mood  -Patient does not endorse cardinal signs/symptoms of major depressive disorder.   -No report of significant changes in mood, crying spells, frequent mood swings, or significant irritability.         #Behavior  -No report of physical aggression, significant property destruction, elopement, or self-injurious behavior.   -No report of significant issues with anger or impulse control.      #Anxiety  -No report of excessive worrying, perseverating, or reassurance-seeking behavior.   -No report of significant restlessness, pacing, or other signs suggesting psychomotor agitation.   -No report of signs/symptoms consistent with separation anxiety or panic attacks.      #Psychosis  -Occasional  self-talk, but otherwise no report of yg auditory hallucinations  -No report of behaviors suggestive of paranoia  -No report of yg delusional beliefs  -No report of visual hallucinations, command hallucinations., persecutory delusions, ideas of reference, or grossly disorganized thought pattern/behavior.        #Medication   -Endorses medication adherence.  -No report of concerns for medication side effects.   -No report of significant issues with constipation (beyond baseline chronic constipation), excessive sedation, drooling, dizziness, tremors, rigidity, or shuffling gait.      #Health   Stable  -No report of hospitalizations, ED visits, new/worsening medical issues, or changes in non-psych medication  -Patient maintains regular follow up appointments with other multiple providers for her complex medical co-morbidities. No report of problem-based visits outside of regularly scheduled maintenance.      REVIEW OF SYMPTOMS  -Depression/Mood: as per HPI  -Anxiety: negative  -Psychosis: negative  -Miranda: negative  -ADHD: negative  -ODD: negative  -OCD: negative  -Sexually inappropriate behavior: none reported  -Sleep: see HPI  -Appetite: No issues reported. No report of pica. No changes from baseline  -Medical ROS: no report of difficulty urinating, seizures, rash, polydipsia, or constipation beyond baseline.  *All other systems have been reviewed and are negative for complaint unless otherwise noted above       ------------------------------------------  PSYCHIATRIC/BEHAVIORAL HISTORY  -Prior diagnoses: Moderate ID, Unspecified psychosis, Dysthymic disorder, MDD, ASD, Stereotypic Movement disorder    -Was previously seeing psychiatrist Dr. Ortiz  -Therapist: none   -History of violence: aggression in the past  -No reported history of self-injurious behavior  -No reported history of suicide attempt  -Current psych meds:   --Escitalopram 30 (20 + 10) mg per day at bedtime  --Melatonin 10 mg at  bedtime  --Metoprolol 50 mg bid at 7 am and 5 pm  --Olanzapine 15 mg at bedtime  --Trazodone 100 mg at bedtime  --Lorazepam 1 mg prn before dental exams        DEVELOPMENTAL/FUNCTIONAL HISTORY  -No reported maternal illness, injury, smoking, alcohol, drug use, or other toxic exposures during pregnancy  -Care needs: assistance with iADLS  -Language/Communication abilities: fairly good verbal fluency. Spontaneous speech. Some impairments in pragmatic language abilities.  -Cognitive abilities: moderate ID per previous psychiatrist      SOCIAL HISTORY  -Living situation: in a supported living group home with 3 other guys about the same age as him. Patient reportedly gets along well with them. Has been with [employer] for 20+ years  -Family involvement: Significant involvement. Patient is the oldest sibling. Has 2 brothers and 1 sister. Talks to them on the phone.   -Work/school: attends day program  -Guardianship status: self  -Does not endorse smoking, ETOH, or illicit drug use. No reported history of past substance abuse.  -No reported history of significant trauma   -No reported access to firearms         PAST MEDICAL HISTORY  -Benign essential hypertension  -Hyperlipidemia  -TMJ syndrome  -Chronic dental caries   -Vitamin D deficiency  -Chronic constipation  -GERD  -BPH  -No reported history of seizures  -No reported history of cardiac issues  -Up to date with colonoscopy per staff  -PCP: Iza Sherman MD        CURRENT MEDICATIONS  -Info provided by caregivers        ALLERGIES  -No known drug allergies      PHARMACY  -[facility]        Mental Status Exam:     Appearance: adequate grooming   Build: average  Demeanor: average   Eye Contact: average   Motor Activity: Average, no PMA/PMR.  Musculoskeletal: No TD, tics, or tremor appreciated. AIMS score 0.   Mood: euthymic   Affect: full  Speech: Fluent, spontaneous speech. Fair pragmatic language abilities.  Thought Process: [place]. Generally goal directed.  Associations are logical.  Thought Content: Does not endorse suicidal or homicidal ideation, no delusions elicited.   Thought Perception: Does not endorse auditory or visual hallucinations. Does not appear to be responding to hallucinatory stimuli  Orientation: alert, oriented x 3  Attention/Concentration: normal  Cognition: intellectual disability  Insight: impaired, in setting of intellectual/developmental disability  Judgement: impaired, in setting of intellectual/developmental disability        ------------------------------------------  Risk Assessment:  Patient felt to be at low acute and imminent risk of harm to self and others based on consideration of their risk and protective factors, as well as evaluation of their current clinical condition. However, chronic risk is elevated given past history of aggression towards others. Patient does not currently meet criteria for inpatient psychiatric hospitalization given that they do not exhibit evidence of clinically significant deterioration in baseline psychiatric illness, aggression, self-injury, or ability to care for themselves. There is no evidence that patient's current caregivers/living environment are unable to safely manage patient's behavior. Outpatient management is currently felt to be appropriate and in the best interest of the patient. Overall risk mitigated by psychiatric follow-up care, use of psychotropic medication, 24/7 supervision, and [facility] services. Have engaged patient/caregivers in safety planning. They are aware of emergency psychiatric resources available in the event of an acute psychiatric emergency, such as Mobile Crisis, 911, and local ER.           __________________________  IMPRESSION  New patient evaluation to transition care to this provider for management of psychotropic medication.    Report of sleep disturbance in the setting of nighttime household disruption by roommate. Unfortunately, patient  getting awakened by  roommate is not something likely ameliorated by medication. Otherwise, overall presentation appears consistent with patient's reported psychiatric and behavioral baseline.     Morning sluggishness previously reported has possibly been getting worse since having sleep disrupted.  While he currently does not appear significantly impaired from a functional standpoint, that is certainly a risk for the future. Pre-existing report of daytime sluggishness and presence of risk factors suggest that obstructive sleep apnea is a possibility. Per staff, patient has never had a sleep study. Recommended discussing with PCP at upcoming appointment. If not, in future, may refer to sleep medicine. Will continue current medications and follow up after his PCP appointment in June.      Diagnoses:  -r/o Sleep Disorder  -Psychosis, unspecified  -Dysthymic disorder/MDD  -Autism spectrum disorder  -Stereotypic movement disorder  -Intellectual disability          PLAN / MANAGEMENT / RECOMMENDATIONS               #Visit Complexity  -Visit of high complexity given patient's intellectual/developmental disability and/or impaired communication abilities resulting in need for the presence of a third party (staff) to provide clinical information and history.      #Medication Management  -Continue:  --Escitalopram 30 (20 + 10) mg per day at bedtime  --Melatonin 10 mg at bedtime  --Metoprolol 50 mg bid at 7 am and 5 pm  --Olanzapine 15 mg at bedtime  --Trazodone 100 mg at bedtime  -PRN:   --Lorazepam 1 mg before dental exams      #Medication Considerations  -Medication consent/assent:   Risks, benefits, alternatives, off-label uses, black box warnings, and frequent/important side effects of medications have been discussed with patient/caregiver. To the extent possible, they have voiced understanding and agreement with recommended use of psychotropic medication.     -Medical necessity for continued treatment with psychotropic medication:      []  Symptom reduction        [] Improvement in functioning      [x] Reduce risk of harm to self/others      [x] Maintenance therapy to prevent deterioration in functioning      -Rational for not reducing medication dose at this time:           [x] Significant risk of deterioration in functioning       [x] Concern for elevating risk of harm to self/others      [] Prior dose reduction unsuccessful and/or harmful      [x] Psychiatric/behavioral condition not adequately stabilized/optimized       [] Medication regimen recently modified         -OARRS  --I have personally reviewed patient's OARRS report. I have considered the risks of abuse, dependence, addiction, and diversion and feel that the potential benefits of treatment with a controlled substance currently outweigh the potential risks.      -Risk/Benefit assessment:  There is no report of signs/symptoms consistent with medication-induced impairment in daily functioning. At this time, benefits of medication felt to outweigh potential risks. But we will continue to reassess need for psychotropic medication at regular 3 to 6 month intervals, or sooner as clinically indicated.      #Medication Monitoring Plan  --Labs due: February 2025  --Labs to monitor: CBC, CMP, TSH/T4, lipid panel, Hgb A1c, VPA level, EKG      #MDM/Complexity Issues    [] Chronic medical comorbidities     [] Chronic risk of harm to self/others     [x] Intellectual/Developmental disability     [x] Need for independent historian due to intellectual/developmental disability and/or           impaired communication abilities     [x] Controlled substance medication requiring regular monitoring     [x] Medication requiring significant ongoing monitoring for toxicity     #Counseling Provided     [x] Diagnostic impression/prognosis      [x] Risks and benefits of treatment options     [x] Instruction for management/treatment and follow-up     [x] Educated patient/caregiver about: behavioral interventions,  sleep hygiene, safety planning                  #Coordination of care provided    [] Family    [x] Caregiver/DSP/Staff       []Agency supervisor       [] Nursing staff      [] SSA/          []Therapist                     [] Guardian      #Follow-up       -in about 3 months, or sooner if new/worsening symptoms         >> Scheduled virtual Follow-up Appt on 6/10/2024 at 14:00          Time  -Prep time on date of the patient encounter: 10 minutes  -Time spent directly with patient/family/caregiver: 50 minutes  -Additional time spent on patient care activities: 10 minutes  -Documentation time: 20 minutes  -Total time on date of patient encounter: 90 minutes          Scribe Attestation  By signing my name below, I, Jennifer Austin , Scribe   attest that this documentation has been prepared under the direction and in the presence of Chandrika Bedolla DO.

## 2024-04-08 PROBLEM — F79 INTELLECTUAL DISABILITY: Status: ACTIVE | Noted: 2023-05-23

## 2024-04-08 PROBLEM — F32.9 MAJOR DEPRESSIVE DISORDER: Status: ACTIVE | Noted: 2024-04-08

## 2024-04-08 PROBLEM — G47.9 SLEEP DISORDER: Status: ACTIVE | Noted: 2024-04-08

## 2024-04-08 PROBLEM — Z86.59 PSYCHIATRIC FOLLOW-UP: Status: ACTIVE | Noted: 2024-04-08

## 2024-04-08 PROBLEM — Z09 PSYCHIATRIC FOLLOW-UP: Status: ACTIVE | Noted: 2024-04-08

## 2024-05-09 ENCOUNTER — OFFICE VISIT (OUTPATIENT)
Dept: PRIMARY CARE | Facility: CLINIC | Age: 59
End: 2024-05-09
Payer: MEDICARE

## 2024-05-09 VITALS
RESPIRATION RATE: 16 BRPM | SYSTOLIC BLOOD PRESSURE: 110 MMHG | WEIGHT: 215.2 LBS | TEMPERATURE: 97.5 F | HEART RATE: 80 BPM | BODY MASS INDEX: 33.78 KG/M2 | HEIGHT: 67 IN | OXYGEN SATURATION: 94 % | DIASTOLIC BLOOD PRESSURE: 71 MMHG

## 2024-05-09 DIAGNOSIS — E78.5 HYPERLIPIDEMIA, UNSPECIFIED HYPERLIPIDEMIA TYPE: ICD-10-CM

## 2024-05-09 DIAGNOSIS — C91.10 CLL (CHRONIC LYMPHOCYTIC LEUKEMIA) (MULTI): ICD-10-CM

## 2024-05-09 DIAGNOSIS — I10 BENIGN ESSENTIAL HYPERTENSION: ICD-10-CM

## 2024-05-09 DIAGNOSIS — G47.10 HYPERSOMNIA: Primary | ICD-10-CM

## 2024-05-09 PROBLEM — G89.29 CHRONIC PAIN OF RIGHT UPPER EXTREMITY: Status: ACTIVE | Noted: 2024-05-09

## 2024-05-09 PROBLEM — M79.601 CHRONIC PAIN OF RIGHT UPPER EXTREMITY: Status: ACTIVE | Noted: 2024-05-09

## 2024-05-09 PROBLEM — R32 URINARY INCONTINENCE: Status: ACTIVE | Noted: 2024-05-09

## 2024-05-09 PROCEDURE — 1036F TOBACCO NON-USER: CPT | Performed by: INTERNAL MEDICINE

## 2024-05-09 PROCEDURE — 3078F DIAST BP <80 MM HG: CPT | Performed by: INTERNAL MEDICINE

## 2024-05-09 PROCEDURE — 3074F SYST BP LT 130 MM HG: CPT | Performed by: INTERNAL MEDICINE

## 2024-05-09 PROCEDURE — 99213 OFFICE O/P EST LOW 20 MIN: CPT | Performed by: INTERNAL MEDICINE

## 2024-05-09 PROCEDURE — G2211 COMPLEX E/M VISIT ADD ON: HCPCS | Performed by: INTERNAL MEDICINE

## 2024-05-09 ASSESSMENT — ENCOUNTER SYMPTOMS
WOUND: 0
NERVOUS/ANXIOUS: 0
DIARRHEA: 0
POLYDIPSIA: 0
ARTHRALGIAS: 0
SORE THROAT: 0
MYALGIAS: 0
POLYPHAGIA: 0
PALPITATIONS: 0
UNEXPECTED WEIGHT CHANGE: 0
CHILLS: 0
HEADACHES: 0
SHORTNESS OF BREATH: 0
FEVER: 0
DYSURIA: 0
WHEEZING: 0
EYE PAIN: 0
HEMATURIA: 0
NAUSEA: 0
DIZZINESS: 0
BLOOD IN STOOL: 0
COUGH: 0
ABDOMINAL PAIN: 0
CONSTIPATION: 0
RHINORRHEA: 0
FREQUENCY: 0
CHEST TIGHTNESS: 0
DYSPHORIC MOOD: 0
VOMITING: 0

## 2024-05-09 ASSESSMENT — PATIENT HEALTH QUESTIONNAIRE - PHQ9
SUM OF ALL RESPONSES TO PHQ9 QUESTIONS 1 AND 2: 0
2. FEELING DOWN, DEPRESSED OR HOPELESS: NOT AT ALL
1. LITTLE INTEREST OR PLEASURE IN DOING THINGS: NOT AT ALL

## 2024-05-09 NOTE — PROGRESS NOTES
"Subjective   Patient ID: Ede Zurita is a 58 y.o. male who presents for Follow-up.    HPI     Here for followup  No concerns from house  BP looking good  Ede states feeling well  Psychiatry notes states he has had tiredness and recs are sleep study. Never has had one    Seeing  urology    No weight loss  Review of Systems   Constitutional:  Negative for chills, fever and unexpected weight change.   HENT:  Negative for congestion, hearing loss, rhinorrhea and sore throat.    Eyes:  Negative for pain and visual disturbance.   Respiratory:  Negative for cough, chest tightness, shortness of breath and wheezing.    Cardiovascular:  Negative for chest pain and palpitations.   Gastrointestinal:  Negative for abdominal pain, blood in stool, constipation, diarrhea, nausea and vomiting.   Endocrine: Negative for cold intolerance, heat intolerance, polydipsia and polyphagia.   Genitourinary:  Negative for dysuria, frequency and hematuria.   Musculoskeletal:  Negative for arthralgias and myalgias.   Skin:  Negative for rash and wound.   Neurological:  Negative for dizziness, syncope and headaches.   Psychiatric/Behavioral:  Negative for dysphoric mood. The patient is not nervous/anxious.        Objective   /71 (BP Location: Left arm, Patient Position: Sitting, BP Cuff Size: Adult)   Pulse 80   Temp 36.4 °C (97.5 °F)   Resp 16   Ht 1.689 m (5' 6.5\")   Wt 97.6 kg (215 lb 3.2 oz)   SpO2 94%   BMI 34.21 kg/m²     Physical Exam  Vitals reviewed.   Constitutional:       Appearance: Normal appearance. He is not ill-appearing.   HENT:      Head: Normocephalic and atraumatic.      Right Ear: Tympanic membrane normal.      Left Ear: Tympanic membrane normal.      Nose: Nose normal.      Mouth/Throat:      Mouth: Mucous membranes are dry.      Pharynx: Oropharynx is clear.   Eyes:      Extraocular Movements: Extraocular movements intact.      Conjunctiva/sclera: Conjunctivae normal.      Pupils: Pupils are equal, round, and " reactive to light.   Cardiovascular:      Rate and Rhythm: Normal rate and regular rhythm.   Pulmonary:      Effort: Pulmonary effort is normal.      Breath sounds: Normal breath sounds. No wheezing.   Abdominal:      General: There is no distension.      Palpations: Abdomen is soft. There is no mass.      Tenderness: There is no abdominal tenderness.   Musculoskeletal:         General: No swelling.      Cervical back: Neck supple.   Lymphadenopathy:      Cervical: No cervical adenopathy.   Neurological:      General: No focal deficit present.      Mental Status: He is alert.      Gait: Gait normal.   Psychiatric:         Mood and Affect: Mood normal.         Behavior: Behavior normal.         Thought Content: Thought content normal.         Assessment/Plan   Problem List Items Addressed This Visit             ICD-10-CM    Benign essential hypertension I10    CLL (chronic lymphocytic leukemia) (Multi) C91.10    Hyperlipidemia E78.5     Other Visit Diagnoses         Codes    Hypersomnia    -  Primary G47.10    Relevant Orders    Home sleep apnea test (HSAT)        Hypersomnia- check home sleep study     COnstipation: on colace  -seeing GI     Unintentional weight loss: new diagnosis of CLL  -seeing heme and having workup--was advised not from CLL. 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  -had recent c-scope, CT head, CT C/A/P  -weight issues resolved  6/22-  said as needed     Lung nodule 11/22-last checked  -ordered--never did, advised to do     left hip pain: neg and resolved     Hypertriglyceridemia:      GERD: resolved with stopping ASA and higher PPI  -they saw [facility] had normal EGD recently  -avoid nsaids     Urinary urgency  -PSA WNL  -on flomax  -sees urology     Hypertension: controlled with norvasc and metoprolol     Hyperlipidemia: on simvastatin, TG getting better (lipids done 2/23     Moderate intellectual  disability/dysthymic disorder/ ASD/stereotypic movement disorder  -sees / Angel  -on lexapro, olanzapine and trazodone     Insomnia: see above     Hx of gastric polyps on EGD 5/3/19     f/u 6 months.  ENT: Dr. Rowe  Eye doc: Dr. Tomas  GI: Dr. Saroj Sun     [facility]  -Prostate Cancer Screenin/24 WNL  -Colonoscopy:3/15/22-repeat 5 years ([facility])  -Vaccinations: Pneumovax, tdap, shingrix, flu UTD   Lives at [facility]

## 2024-05-09 NOTE — PATIENT INSTRUCTIONS
Ede needs to complete a CT chest as he is due to check a lung nodule. This was ordered from last visit    I am going to order a home sleep study to screen for sleep apnea

## 2024-05-13 ENCOUNTER — LAB (OUTPATIENT)
Dept: LAB | Facility: LAB | Age: 59
End: 2024-05-13
Payer: MEDICARE

## 2024-05-13 DIAGNOSIS — Z11.59 ENCOUNTER FOR SCREENING FOR OTHER VIRAL DISEASES: ICD-10-CM

## 2024-05-13 DIAGNOSIS — E11.59 TYPE 2 DIABETES MELLITUS WITH OTHER CIRCULATORY COMPLICATIONS (MULTI): Primary | ICD-10-CM

## 2024-05-13 LAB
HBV CORE IGM SER QL: NONREACTIVE
HBV SURFACE AB SER-ACNC: <3.1 MIU/ML
HBV SURFACE AG SERPL QL IA: NONREACTIVE

## 2024-05-13 PROCEDURE — 86705 HEP B CORE ANTIBODY IGM: CPT

## 2024-05-13 PROCEDURE — 36415 COLL VENOUS BLD VENIPUNCTURE: CPT

## 2024-05-13 PROCEDURE — 87340 HEPATITIS B SURFACE AG IA: CPT

## 2024-05-13 PROCEDURE — 87522 HEPATITIS C REVRS TRNSCRPJ: CPT

## 2024-05-13 PROCEDURE — 86706 HEP B SURFACE ANTIBODY: CPT

## 2024-05-14 LAB
HCV RNA SERPL NAA+PROBE-ACNC: NOT DETECTED K[IU]/ML
HCV RNA SERPL NAA+PROBE-LOG IU: NORMAL {LOG_IU}/ML

## 2024-05-16 ENCOUNTER — CLINICAL SUPPORT (OUTPATIENT)
Dept: SLEEP MEDICINE | Facility: HOSPITAL | Age: 59
End: 2024-05-16
Payer: MEDICARE

## 2024-05-16 DIAGNOSIS — G47.10 HYPERSOMNIA: ICD-10-CM

## 2024-05-16 PROCEDURE — 95806 SLEEP STUDY UNATT&RESP EFFT: CPT | Performed by: PSYCHIATRY & NEUROLOGY

## 2024-06-04 ENCOUNTER — HOSPITAL ENCOUNTER (OUTPATIENT)
Dept: RADIOLOGY | Facility: CLINIC | Age: 59
Discharge: HOME | End: 2024-06-04
Payer: COMMERCIAL

## 2024-06-04 DIAGNOSIS — R91.1 LUNG NODULE: ICD-10-CM

## 2024-06-04 PROCEDURE — 71250 CT THORAX DX C-: CPT

## 2024-06-04 PROCEDURE — 71250 CT THORAX DX C-: CPT | Performed by: RADIOLOGY

## 2024-06-10 ENCOUNTER — OFFICE VISIT (OUTPATIENT)
Dept: BEHAVIORAL HEALTH | Facility: CLINIC | Age: 59
End: 2024-06-10
Payer: MEDICARE

## 2024-06-10 DIAGNOSIS — Z86.59 PSYCHIATRIC FOLLOW-UP: ICD-10-CM

## 2024-06-10 DIAGNOSIS — F98.4 STEREOTYPIC MOVEMENT DISORDER: ICD-10-CM

## 2024-06-10 DIAGNOSIS — Z09 PSYCHIATRIC FOLLOW-UP: ICD-10-CM

## 2024-06-10 DIAGNOSIS — F84.0 AUTISM SPECTRUM DISORDER (HHS-HCC): ICD-10-CM

## 2024-06-10 DIAGNOSIS — F29 PSYCHOSIS, UNSPECIFIED PSYCHOSIS TYPE (MULTI): ICD-10-CM

## 2024-06-10 DIAGNOSIS — F32.9 MAJOR DEPRESSIVE DISORDER, REMISSION STATUS UNSPECIFIED, UNSPECIFIED WHETHER RECURRENT: ICD-10-CM

## 2024-06-10 DIAGNOSIS — G47.9 SLEEP DISORDER: ICD-10-CM

## 2024-06-10 DIAGNOSIS — F79 INTELLECTUAL DISABILITY: ICD-10-CM

## 2024-06-10 PROCEDURE — 99215 OFFICE O/P EST HI 40 MIN: CPT | Performed by: STUDENT IN AN ORGANIZED HEALTH CARE EDUCATION/TRAINING PROGRAM

## 2024-06-10 NOTE — PATIENT INSTRUCTIONS
AFTER VISIT SUMMARY      Date: 6/10/2024  Appointment with Psychiatrist - Dr. Bedolla      Reason for Visit:  -Routine follow-up/Medication management      Discussed during Appointment:   -Sleep study referral, environmental strategies for improved sleep  -Physical health, psychiatric/behavioral symptoms, daily functioning, new issues/concerns     -Treatment plan/management      Clinical Impression/Status Update:  Patient's roommate remains disruptive which continues to affect his sleep quality. His PCP requested a home sleep study and awaiting results. Recordings were shipped back in late May. Pending results of the home study, given that patient never had a sleep study done and daytime sluggishness predates roommate's household disruption, will put in a referral to sleep medicine. Provided contact for  sleep medicine main number for them to call and schedule an appointment.  Recommended environmental strategies such as closing the door of his room and purchasing a white noise machine to block out the noise coming from his roommate.  Reminded staff about patient getting his EKG done.  Will continue to monitor closely and make no medication changes today. Follow up in 3 months.    -Risk/Benefit assessment:   Medical necessity for continued treatment with psychotropic medication:   There is no report of signs/symptoms consistent with medication-induced impairment in daily functioning. At this time, benefits of medication felt to outweigh potential risks. But we will continue to reassess need for psychotropic medication at regular 3 to 6 month intervals, or sooner as clinically indicated.      #Clinic Policies/Procedures  -Refills  Prescriptions will be sent to your pharmacy with enough refills to last until your next appointment. For established patients, we typically provide 6 refills for regular meds and 3 refills for controlled substances (e.g., ADHD stimulant medication, benzodiazepines such as lorazepam).  We will not provide additional refills beyond that without having an appointment. You can schedule an appointment by calling our office at [phone].     -Paperwork Requests (e.g., Expert Eval for guardianship)  We ask that you please schedule an appointment if needing paperwork filled out by the doctor (e.g., Expert Eval, FMLA form).  Please provide as much information as possible about your request and email the doctor any forms needing to be filled out prior to the appointment.       ===========================  INSTRUCTIONS/RECOMMENDATIONS  ===========================    #Medication   -No medication changes made today  --Continue taking your psych medications the same as usual    --Refills will be sent to your pharmacy    >>For prior authorization issues at the pharmacy -> Call the office and ask to talk to Nurse Rangel.      # Sleep Medicine  ([phone]; [phone])      #Technical Issues with Epic -> Please help us improve the Epic experience  To help identify issues needing to be fixed and improve patient care, please report any issues you experience with Epic or  [employer], such as difficulty connecting to video during virtual visits.  [phone]      [email or web address] machine  [email or web address]    #Follow-up  --Your next appointment is scheduled for 9/9/2024 at 2:00pm (virtual visit with [employer]    *If having new/worsening symptoms, please call the clinic ([phone]) to discuss being seen sooner   >>If unable to reach the office, send me an email at [email or web address]

## 2024-06-10 NOTE — PROGRESS NOTES
Others Attending Appointment:  -  *Presence necessary as independent historian given patient's intellectual/developmental disability and/or impaired communication abilities        LAST INTERVENTION     Last seen about 2 months ago. Report of sleep disturbance and morning sluggishness. Recommended discussing with PCP. No medication changes.      HISTORY OF PRESENT ILLNESS    #Interval Change  -Patient reported to be at about their psychiatric/behavioral baseline  -No report of new issues/concerns    -Patient has home sleep apnea test. Awaiting results.      #Sleep  Patient is awaiting results of his home sleep apnea test. As prior, his roommate is still disruptive.  Recall from prior: Report of sleep disturbance in the setting of his roommate that causes disruption at night interrupting his sleep. Disruption usually starts between 2 and 4 am on most nights. Patient goes to bed around 10 pm. Without the disruption, patient will usually wake up around 6.30 am. Staff reports that sleep disturbance results in 'agitation' defined by staff as yelling at roommate. Staff reports that prior to sleep being disrupted, patient had tendency for morning fatigue and episodes of incontinence. Has been noted on past visits. Per staff knowledge, has never had a sleep study. Patient reports that he takes naps at the day program but unclear how often and in what duration.       #Mood/Irritability  Stable.  -Patient does not endorse cardinal signs/symptoms of major depressive disorder.   -No report of significant changes in mood, crying spells, frequent mood swings, or significant irritability.   Recall from prior: Patient reports that he is generally in a happy mood      #Behavior  -No report of physical aggression, significant property destruction, elopement, or self-injurious behavior.   -No report of significant issues with anger or impulse control.      #Anxiety  -No report of excessive worrying, perseverating, or  reassurance-seeking behavior.   -No report of significant restlessness, pacing, or other signs suggesting psychomotor agitation.   -No report of signs/symptoms consistent with separation anxiety or panic attacks.      #Psychosis  Stable  -No report of behaviors suggestive of paranoia  -No report of yg delusional beliefs  -No report of visual hallucinations, command hallucinations., persecutory delusions, ideas of reference, or grossly disorganized thought pattern/behavior.  Recall from prior: Occasional self-talk, but otherwise no report of yg auditory hallucinations      #Medication   -Endorses medication adherence.  -No report of concerns for medication side effects.   -No report of significant issues with constipation (beyond baseline chronic constipation), excessive sedation, drooling, dizziness, tremors, rigidity, or shuffling gait.  -Patient/caregiver report that current medication regimen is felt to be effective and beneficial.  -Patient/caregiver report strong preference for not making medication changes at this time.      #Health   PCP ordered home sleep apnea test. Test has been carried out. Currently awaiting results.  -No report of hospitalizations, ED visits, new/worsening medical issues, or changes in non-psych medication  -Patient maintains regular follow up appointments with other multiple providers for her complex medical co-morbidities. No report of problem-based visits outside of regularly scheduled maintenance.      REVIEW OF SYMPTOMS  -Depression/Mood: as per HPI  -Anxiety: negative  -Psychosis: negative  -Miranda: negative  -ADHD: negative  -ODD: negative  -OCD: negative  -Sexually inappropriate behavior: none reported  -Sleep: see HPI  -Appetite: No issues reported. No report of pica. No changes from baseline  -Medical ROS: no report of difficulty urinating, seizures, rash, polydipsia, or constipation beyond baseline.  *All other systems have been reviewed and are negative for complaint  unless otherwise noted above       ------------------------------------------  PSYCHIATRIC/BEHAVIORAL HISTORY  -Prior diagnoses: Moderate ID, Unspecified psychosis, Dysthymic disorder, MDD, ASD, Stereotypic Movement disorder    -Was previously seeing psychiatrist Dr. Ortiz  -Therapist: none   -History of violence: aggression in the past  -No reported history of self-injurious behavior  -No reported history of suicide attempt  -Current psych meds:   --Escitalopram 30 (20 + 10) mg per day at bedtime  --Melatonin 10 mg at bedtime  --Metoprolol 50 mg bid at 7 am and 5 pm  --Olanzapine 15 mg at bedtime  --Trazodone 100 mg at bedtime  --Lorazepam 1 mg prn before dental exams        DEVELOPMENTAL/FUNCTIONAL HISTORY  -No reported maternal illness, injury, smoking, alcohol, drug use, or other toxic exposures during pregnancy  -Care needs: assistance with iADLS  -Language/Communication abilities: fairly good verbal fluency. Spontaneous speech. Some impairments in pragmatic language abilities.  -Cognitive abilities: moderate ID per previous psychiatrist      SOCIAL HISTORY  -Living situation: in a supported living group home with 3 other guys about the same age as him. Patient reportedly gets along well with them. Has been with [employer] for 20+ years  -Family involvement: Significant involvement. Patient is the oldest sibling. Has 2 brothers and 1 sister. Talks to them on the phone.   -Work/school: attends day program  -Guardianship status: self  -Does not endorse smoking, ETOH, or illicit drug use. No reported history of past substance abuse.  -No reported history of significant trauma   -No reported access to firearms         PAST MEDICAL HISTORY  -Benign essential hypertension  -Hyperlipidemia  -TMJ syndrome  -Chronic dental caries   -Vitamin D deficiency  -Chronic constipation  -GERD  -BPH  -No reported history of seizures  -No reported history of cardiac issues  -Up to date with colonoscopy per staff  -PCP: Iza  DESTINEE Sherman MD        CURRENT MEDICATIONS  -Info provided by caregivers        ALLERGIES  -No known drug allergies      PHARMACY  -[facility]        Mental Status Exam:     Appearance: adequate grooming   Build: average  Demeanor: average   Eye Contact: average   Motor Activity: Average, no PMA/PMR.  Musculoskeletal: No TD, tics, or tremor appreciated. AIMS score 0.   Mood: euthymic   Affect: full  Speech: Fluent, spontaneous speech. Fair pragmatic language abilities.  Thought Process: [place]. Generally goal directed. Associations are logical.  Thought Content: Does not endorse suicidal or homicidal ideation, no delusions elicited.   Thought Perception: Does not endorse auditory or visual hallucinations. Does not appear to be responding to hallucinatory stimuli  Orientation: alert, oriented x 3  Attention/Concentration: normal  Cognition: intellectual disability  Insight: impaired, in setting of intellectual/developmental disability  Judgement: impaired, in setting of intellectual/developmental disability        ------------------------------------------  Risk Assessment:  Patient felt to be at low acute and imminent risk of harm to self and others based on consideration of their risk and protective factors, as well as evaluation of their current clinical condition. However, chronic risk is elevated given past history of aggression towards others. Patient does not currently meet criteria for inpatient psychiatric hospitalization given that they do not exhibit evidence of clinically significant deterioration in baseline psychiatric illness, aggression, self-injury, or ability to care for themselves. There is no evidence that patient's current caregivers/living environment are unable to safely manage patient's behavior. Outpatient management is currently felt to be appropriate and in the best interest of the patient. Overall risk mitigated by psychiatric follow-up care, use of psychotropic medication, 24/7  supervision, and [place] services. Have engaged patient/caregivers in safety planning. They are aware of emergency psychiatric resources available in the event of an acute psychiatric emergency, such as Mobile Crisis, 911, and local ER.           __________________________  IMPRESSION  New patient evaluation to transition care to this provider for management of psychotropic medication.    Initial Eval:  Report of sleep disturbance in the setting of nighttime household disruption by roommate. Unfortunately, patient  getting awakened by roommate is not something likely ameliorated by medication. Otherwise, overall presentation appears consistent with patient's reported psychiatric and behavioral baseline.     Morning sluggishness previously reported has possibly been getting worse since having sleep disrupted.  While he currently does not appear significantly impaired from a functional standpoint, that is certainly a risk for the future. Pre-existing report of daytime sluggishness and presence of risk factors suggest that obstructive sleep apnea is a possibility. Per staff, patient has never had a sleep study. Recommended discussing with PCP at upcoming appointment. If not, in future, may refer to sleep medicine. Will continue current medications and follow up after his PCP appointment in June.      As of this appointment:  Patient's roommate remains disruptive which continues to affect his sleep quality. His PCP requested a home sleep study and awaiting results. Recordings were shipped back in late May. Pending results of the home study, given that patient never had a sleep study done and daytime sluggishness predates roommate's household disruption, will put in a referral to sleep medicine. Provided contact for  sleep medicine main number ([phone]; [phone]) for them to call and schedule an appointment.  Recommended environmental strategies such as closing the door of his room and purchasing a white  noise machine to block out the noise coming from his roommate.  Reminded staff about patient getting his EKG done.  Will continue to monitor closely and make no medication changes today. Follow up in 3 months.      Diagnoses:  -r/o Sleep Disorder  -Psychosis, unspecified  -Dysthymic disorder/MDD  -Autism spectrum disorder  -Stereotypic movement disorder  -Intellectual disability          PLAN / MANAGEMENT / RECOMMENDATIONS               #Visit Complexity  -Visit of high complexity given patient's intellectual/developmental disability and/or impaired communication abilities resulting in need for the presence of a third party (staff) to provide clinical information and history.      #Medication Management  -Continue:  --Escitalopram 30 (20 + 10) mg per day at bedtime  --Melatonin 10 mg at bedtime  --Metoprolol 50 mg bid at 7 am and 5 pm  --Olanzapine 15 mg at bedtime  --Trazodone 100 mg at bedtime  -PRN:   --Lorazepam 1 mg before dental exams      #Medication Considerations  -Medication consent/assent:   Risks, benefits, alternatives, off-label uses, black box warnings, and frequent/important side effects of medications have been discussed with patient/caregiver. To the extent possible, they have voiced understanding and agreement with recommended use of psychotropic medication.     -Medical necessity for continued treatment with psychotropic medication:      [] Symptom reduction        [] Improvement in functioning      [x] Reduce risk of harm to self/others      [x] Maintenance therapy to prevent deterioration in functioning      -Rational for not reducing medication dose at this time:           [x] Significant risk of deterioration in functioning       [x] Concern for elevating risk of harm to self/others      [] Prior dose reduction unsuccessful and/or harmful      [x] Psychiatric/behavioral condition not adequately stabilized/optimized       [] Medication regimen recently modified         -OARRS  --I have  personally reviewed patient's OARRS report. I have considered the risks of abuse, dependence, addiction, and diversion and feel that the potential benefits of treatment with a controlled substance currently outweigh the potential risks.      -Risk/Benefit assessment:  There is no report of signs/symptoms consistent with medication-induced impairment in daily functioning. At this time, benefits of medication felt to outweigh potential risks. But we will continue to reassess need for psychotropic medication at regular 3 to 6 month intervals, or sooner as clinically indicated.      #Medication Monitoring Plan  --Labs due: February 2025  --Labs to monitor: CBC, CMP, TSH/T4, lipid panel, Hgb A1c, VPA level, EKG      #MDM/Complexity Issues    [] Chronic medical comorbidities     [] Chronic risk of harm to self/others     [x] Intellectual/Developmental disability     [x] Need for independent historian due to intellectual/developmental disability and/or           impaired communication abilities     [x] Controlled substance medication requiring regular monitoring     [x] Medication requiring significant ongoing monitoring for toxicity     #Counseling Provided     [x] Diagnostic impression/prognosis      [x] Risks and benefits of treatment options     [x] Instruction for management/treatment and follow-up     [x] Educated patient/caregiver about: behavioral interventions, sleep hygiene, safety planning                  #Coordination of care provided    [] Family    [x] Caregiver/DSP/Staff       []Agency supervisor       [] Nursing staff      [] SSA/          []Therapist                     [] Guardian      #Follow-up       -in about 3 months, or sooner if new/worsening symptoms         >> Scheduled virtual Follow-up Appt on 9/9/2024 at 14:00          Time  -Prep time on date of the patient encounter: 10 minutes  -Time spent directly with patient/family/caregiver: 40 minutes  -Additional time spent on patient care  activities: 10 minutes  -Documentation time: 10 minutes  -Total time on date of patient encounter: 70 minutes          Scribe Attestation  By signing my name below, I, Jennifer Austin, [employer]   attest that this documentation has been prepared under the direction and in the presence of Chandrika Bedolla DO.

## 2024-06-18 DIAGNOSIS — F29 PSYCHOSIS, UNSPECIFIED PSYCHOSIS TYPE (MULTI): ICD-10-CM

## 2024-06-18 RX ORDER — OLANZAPINE 15 MG/1
15 TABLET ORAL NIGHTLY
Qty: 30 TABLET | Refills: 6 | Status: CANCELLED | OUTPATIENT
Start: 2024-06-18

## 2024-06-19 ENCOUNTER — TELEPHONE (OUTPATIENT)
Dept: BEHAVIORAL HEALTH | Facility: CLINIC | Age: 59
End: 2024-06-19
Payer: COMMERCIAL

## 2024-06-21 DIAGNOSIS — F41.1 GAD (GENERALIZED ANXIETY DISORDER): ICD-10-CM

## 2024-06-21 DIAGNOSIS — F32.9 MAJOR DEPRESSIVE DISORDER, REMISSION STATUS UNSPECIFIED, UNSPECIFIED WHETHER RECURRENT: ICD-10-CM

## 2024-06-21 DIAGNOSIS — F29 PSYCHOSIS, UNSPECIFIED PSYCHOSIS TYPE (MULTI): ICD-10-CM

## 2024-06-21 RX ORDER — ESCITALOPRAM OXALATE 10 MG/1
10 TABLET ORAL NIGHTLY
Qty: 30 TABLET | Refills: 2 | Status: SHIPPED | OUTPATIENT
Start: 2024-06-21 | End: 2024-09-19

## 2024-06-21 RX ORDER — OLANZAPINE 15 MG/1
15 TABLET ORAL NIGHTLY
Qty: 30 TABLET | Refills: 2 | Status: SHIPPED | OUTPATIENT
Start: 2024-06-21

## 2024-06-26 DIAGNOSIS — G47.33 OSA (OBSTRUCTIVE SLEEP APNEA): Primary | ICD-10-CM

## 2024-06-27 ENCOUNTER — TELEPHONE (OUTPATIENT)
Dept: PRIMARY CARE | Facility: CLINIC | Age: 59
End: 2024-06-27
Payer: COMMERCIAL

## 2024-06-27 NOTE — TELEPHONE ENCOUNTER
----- Message from Iza Sherman MD sent at 6/26/2024  1:32 PM EDT -----  His sleep study shows severe sleep apnea-- [facility]. [facility]

## 2024-07-03 ENCOUNTER — DOCUMENTATION (OUTPATIENT)
Dept: BEHAVIORAL HEALTH | Facility: CLINIC | Age: 59
End: 2024-07-03
Payer: COMMERCIAL

## 2024-07-18 DIAGNOSIS — I10 BENIGN ESSENTIAL HTN: ICD-10-CM

## 2024-07-19 RX ORDER — AMLODIPINE BESYLATE 5 MG/1
TABLET ORAL
Qty: 30 TABLET | Refills: 10 | Status: SHIPPED | OUTPATIENT
Start: 2024-07-19

## 2024-09-09 ENCOUNTER — APPOINTMENT (OUTPATIENT)
Dept: BEHAVIORAL HEALTH | Facility: CLINIC | Age: 59
End: 2024-09-09
Payer: COMMERCIAL

## 2024-09-09 DIAGNOSIS — Z86.59 HISTORY OF DYSTHYMIC DISORDER: ICD-10-CM

## 2024-09-09 DIAGNOSIS — F29 PSYCHOSIS, UNSPECIFIED PSYCHOSIS TYPE (MULTI): ICD-10-CM

## 2024-09-09 DIAGNOSIS — F98.4 STEREOTYPIC MOVEMENT DISORDER: ICD-10-CM

## 2024-09-09 DIAGNOSIS — G47.33 OSA (OBSTRUCTIVE SLEEP APNEA): ICD-10-CM

## 2024-09-09 DIAGNOSIS — G47.9 SLEEP DISORDER: ICD-10-CM

## 2024-09-09 DIAGNOSIS — Z86.59 PSYCHIATRIC FOLLOW-UP: ICD-10-CM

## 2024-09-09 DIAGNOSIS — F84.0 AUTISM SPECTRUM DISORDER (HHS-HCC): ICD-10-CM

## 2024-09-09 DIAGNOSIS — Z09 PSYCHIATRIC FOLLOW-UP: ICD-10-CM

## 2024-09-09 DIAGNOSIS — F79 INTELLECTUAL DISABILITY: ICD-10-CM

## 2024-09-09 PROCEDURE — 99215 OFFICE O/P EST HI 40 MIN: CPT | Performed by: STUDENT IN AN ORGANIZED HEALTH CARE EDUCATION/TRAINING PROGRAM

## 2024-09-09 NOTE — PROGRESS NOTES
Others Attending Appointment:  -  *Presence necessary as independent historian given patient's intellectual/developmental disability and/or impaired communication abilities        LAST INTERVENTION     Last seen about 3 months ago in June 2024. Patient was awaiting the results of his home sleep study. A referral was put in to sleep medicine. Provided contact for  sleep medicine main number. Recommended environmental strategies to manage sleep. Reminded staff to get patient's EKG done. No medication changes.      HISTORY OF PRESENT ILLNESS    #Interval Change  -Report of improved sleep  -Patient had his HSAT - severe obstructive sleep apnea  -Patient is reportedly engaging in activities out in the community      #Sleep  Improved.   Disruptive roommate is no longer at the house and sleep is reported to have improved. Home sleep apnea test results reveal a diagnosis of severe obstructive sleep apnea that is worse when supine, with an O2 zeinab of 68.7%. Patient has been referred to sleep medicine by his PCP. Upcoming appointment is on 10/24/24.  Recall from prior: Report of sleep disturbance in the setting of his roommate that causes disruption at night interrupting his sleep. Disruption usually starts between 2 and 4 am on most nights. Patient goes to bed around 10 pm. Without the disruption, patient will usually wake up around 6.30 am. Staff reports that sleep disturbance results in 'agitation' defined by staff as yelling at roommate. Staff reports that prior to sleep being disrupted, patient had tendency for morning fatigue and episodes of incontinence. Has been noted on past visits. Per staff knowledge, has never had a sleep study. Patient reports that he takes naps at the day program but unclear how often and in what duration.       #Mood/Irritability  Stable.  -Patient does not endorse cardinal signs/symptoms of major depressive disorder.   -No report of significant changes in mood, crying spells,  frequent mood swings, or significant irritability.   Recall from prior: Patient reports that he is generally in a happy mood      #Behavior  -No report of physical aggression, significant property destruction, elopement, or self-injurious behavior.   -No report of significant issues with anger or impulse control.      #Anxiety  -No report of excessive worrying, perseverating, or reassurance-seeking behavior.   -No report of significant restlessness, pacing, or other signs suggesting psychomotor agitation.   -No report of signs/symptoms consistent with separation anxiety or panic attacks.      #Psychosis  Stable  -No report of behaviors suggestive of paranoia  -No report of yg delusional beliefs  -No report of visual hallucinations, command hallucinations., persecutory delusions, ideas of reference, or grossly disorganized thought pattern/behavior.  Recall from prior: Occasional self-talk, but otherwise no report of yg auditory hallucinations      #Medication   -Endorses medication adherence.  -No report of concerns for medication side effects.   -No report of significant issues with constipation (beyond baseline chronic constipation), excessive sedation, drooling, dizziness, tremors, rigidity, or shuffling gait.  -Patient/caregiver report that current medication regimen is felt to be effective and beneficial.  -Patient/caregiver report strong preference for not making medication changes at this time.      #Health   Home sleep apnea test results reveal a diagnosis of severe obstructive sleep apnea that is worse when supine, with an O2 zeinab of 68.7%. Patient has an upcoming appointment with sleep medicine on 10/24/24.  -No report of hospitalizations, ED visits, new/worsening medical issues, or changes in non-psych medication  -Patient maintains regular follow up appointments with other multiple providers for her complex medical co-morbidities. No report of problem-based visits outside of regularly scheduled  maintenance.      REVIEW OF SYMPTOMS  -Depression/Mood: as per HPI  -Anxiety: negative  -Psychosis: negative  -Miranda: negative  -ADHD: negative  -ODD: negative  -OCD: negative  -Sexually inappropriate behavior: none reported  -Sleep: see HPI  -Appetite: No issues reported. No report of pica. No changes from baseline  -Medical ROS: no report of difficulty urinating, seizures, rash, polydipsia, or constipation beyond baseline.  *All other systems have been reviewed and are negative for complaint unless otherwise noted above       ------------------------------------------  PSYCHIATRIC/BEHAVIORAL HISTORY  -Prior diagnoses: Moderate ID, Unspecified psychosis, Dysthymic disorder, MDD, ASD, Stereotypic Movement disorder    -Was previously seeing psychiatrist Dr. Ortiz  -Therapist: none   -History of violence: aggression in the past  -No reported history of self-injurious behavior  -No reported history of suicide attempt  -Current psych meds:   --Escitalopram 30 (20 + 10) mg per day at bedtime  --Melatonin 10 mg at bedtime  --Metoprolol 50 mg bid at 7 am and 5 pm  --Olanzapine 15 mg at bedtime  --Trazodone 100 mg at bedtime  --Lorazepam 1 mg prn before dental exams        DEVELOPMENTAL/FUNCTIONAL HISTORY  -No reported maternal illness, injury, smoking, alcohol, drug use, or other toxic exposures during pregnancy  -Care needs: assistance with iADLS  -Language/Communication abilities: fairly good verbal fluency. Spontaneous speech. Some impairments in pragmatic language abilities.  -Cognitive abilities: moderate ID per previous psychiatrist      SOCIAL HISTORY  -Living situation: in a supported living group home with 3 other guys about the same age as him. Patient reportedly gets along well with them. Has been with [email or web address] for 20+ years  -Family involvement: Significant involvement. Patient is the oldest sibling. Has 2 brothers and 1 sister. Talks to them on the phone.   -Work/school: attends day  program  -Guardianship status: self  -Does not endorse smoking, ETOH, or illicit drug use. No reported history of past substance abuse.  -No reported history of significant trauma   -No reported access to firearms         PAST MEDICAL HISTORY  -Benign essential hypertension  -Hyperlipidemia  -TMJ syndrome  -Chronic dental caries   -Vitamin D deficiency  -Chronic constipation  -GERD  -BPH  -No reported history of seizures  -No reported history of cardiac issues  -Up to date with colonoscopy per staff  -PCP: Iza Sherman MD        CURRENT MEDICATIONS  -Info provided by caregivers        ALLERGIES  -No known drug allergies      PHARMACY  -[facility]        Mental Status Exam:     Appearance: adequate grooming   Build: average  Demeanor: average   Eye Contact: average   Motor Activity: Average, no PMA/PMR.  Musculoskeletal: No TD, tics, or tremor appreciated. AIMS score 0.   Mood: euthymic   Affect: full  Speech: Fluent, spontaneous speech. Fair pragmatic language abilities.  Thought Process: [place]. Generally goal directed. Associations are logical.  Thought Content: Does not endorse suicidal or homicidal ideation, no delusions elicited.   Thought Perception: Does not endorse auditory or visual hallucinations. Does not appear to be responding to hallucinatory stimuli  Orientation: alert, oriented x 3  Attention/Concentration: normal  Cognition: intellectual disability  Insight: impaired, in setting of intellectual/developmental disability  Judgement: impaired, in setting of intellectual/developmental disability        ------------------------------------------  Risk Assessment:  Patient felt to be at low acute and imminent risk of harm to self and others based on consideration of their risk and protective factors, as well as evaluation of their current clinical condition. However, chronic risk is elevated given past history of aggression towards others. Patient does not currently meet criteria for inpatient  psychiatric hospitalization given that they do not exhibit evidence of clinically significant deterioration in baseline psychiatric illness, aggression, self-injury, or ability to care for themselves. There is no evidence that patient's current caregivers/living environment are unable to safely manage patient's behavior. Outpatient management is currently felt to be appropriate and in the best interest of the patient. Overall risk mitigated by psychiatric follow-up care, use of psychotropic medication, 24/7 supervision, and [facility] services. Have engaged patient/caregivers in safety planning. They are aware of emergency psychiatric resources available in the event of an acute psychiatric emergency, such as Mobile Crisis, 911, and local ER.           __________________________  IMPRESSION  Male with reported past psychiatric history including intellectual disability, autism spectrum disorder, unspecified psychosis, stereotypic movement disorder, and history of depression described as dysthymia who initially presented as ID Clinic transfer of care for management of psychotropic medication following Dr. Ortiz's skilled nursing.    Initial Eval:  Report of sleep disturbance in the setting of nighttime household disruption by roommate. Unfortunately, patient  getting awakened by roommate is not something likely ameliorated by medication. Otherwise, overall presentation appears consistent with patient's reported psychiatric and behavioral baseline.     Morning sluggishness previously reported has possibly been getting worse since having sleep disrupted.  While he currently does not appear significantly impaired from a functional standpoint, that is certainly a risk for the future. Pre-existing report of daytime sluggishness and presence of risk factors suggest that obstructive sleep apnea is a possibility. Per staff, patient has never had a sleep study. Recommended discussing with PCP at upcoming appointment. If not,  in future, may refer to sleep medicine. Will continue current medications and follow up after his PCP appointment in June.    ###  As of this appointment:  -Appears to have remained psychiatrically and behaviorally stable since last visit.   -No report of significant new issues/concerns.   -Report of improved sleep since roommate causing disruption left the house. -Ongoing report of daytime fatigue. Home sleep apnea test revealed severe obstructive sleep apnea that is worse when supine. Reviewed findings of recent sleep study and discussed with patient/caregiver. Sleep hygiene recs provided.  Has upcoming appt with sleep medicine. Will defer management to them.   -Tolerating medication regimen without report of significant side effects.   --Reminded staff about patient getting his EKG done. Recommended that staff consider patient getting it done with sleep medicine and try sending a message to their office to ask if getting an EKG will be possible.  Will continue current medication regimen and plan to  continue to monitor closely with plan for follow up in 2 months.  ###      Diagnoses:  -Sleep disorder, specifically Obstructive Sleep Apnea, severe per home sleep study  -Psychosis, unspecified  -Dysthymic disorder/MDD  -Autism spectrum disorder  -Stereotypic movement disorder  -Intellectual disability, likely moderate          PLAN / MANAGEMENT / RECOMMENDATIONS               #Visit Complexity  -Visit of high complexity given patient's intellectual/developmental disability and/or impaired communication abilities resulting in need for the presence of a third party (staff) to provide clinical information and history.      #Medication Management  -Continue:  --Escitalopram 30 (20 + 10) mg per day at bedtime  --Melatonin 10 mg at bedtime  --Metoprolol 50 mg bid at 7 am and 5 pm  --Olanzapine 15 mg at bedtime  --Trazodone 100 mg at bedtime  -PRN:   --Lorazepam 1 mg before dental exams      #Medication  Considerations  -Medication consent/assent:   Risks, benefits, alternatives, off-label uses, black box warnings, and frequent/important side effects of medications have been discussed with patient/caregiver. To the extent possible, they have voiced understanding and agreement with recommended use of psychotropic medication.     -Medical necessity for continued treatment with psychotropic medication:      [] Symptom reduction        [] Improvement in functioning      [x] Reduce risk of harm to self/others      [x] Maintenance therapy to prevent deterioration in functioning      -Rational for not reducing medication dose at this time:           [x] Significant risk of deterioration in functioning       [x] Concern for elevating risk of harm to self/others      [] Prior dose reduction unsuccessful and/or harmful      [] Psychiatric/behavioral condition not adequately stabilized/optimized       [] Medication regimen recently modified         -OARRS  --I have personally reviewed patient's OARRS report. I have considered the risks of abuse, dependence, addiction, and diversion and feel that the potential benefits of treatment with a controlled substance currently outweigh the potential risks.      -Risk/Benefit assessment:  There is no report of signs/symptoms consistent with medication-induced impairment in daily functioning. At this time, benefits of medication felt to outweigh potential risks. But we will continue to reassess need for psychotropic medication at regular 3 to 6 month intervals, or sooner as clinically indicated.      #Medication Monitoring Plan  --Labs due: February 2025  --Labs to monitor: CBC, CMP, TSH/T4, lipid panel, Hgb A1c, EKG      #MDM/Complexity Issues    [] Chronic medical comorbidities     [] Chronic risk of harm to self/others     [x] Intellectual/Developmental disability     [x] Need for independent historian due to intellectual/developmental disability and/or           impaired  communication abilities     [x] Controlled substance medication requiring regular monitoring     [x] Medication requiring significant ongoing monitoring for toxicity     #Counseling Provided     [x] Diagnostic impression/prognosis      [x] Risks and benefits of treatment options     [x] Instruction for management/treatment and follow-up     [x] Educated patient/caregiver about: behavioral interventions, sleep hygiene, safety planning                  #Coordination of care provided    [] Family    [x] Caregiver/DSP/Staff       []Agency supervisor       [] Nursing staff      [] SSA/          []Therapist                     [] Guardian      #Follow-up       -in about 2 months, or sooner if new/worsening symptoms         >> Scheduled virtual Follow-up Appt on 11/19/2024 at 15:00          Time  -Prep time on date of the patient encounter: 10 minutes  -Time spent directly with patient/family/caregiver: 40 minutes  -Additional time spent on patient care activities: 10 minutes  -Documentation time: 10 minutes  -Total time on date of patient encounter: 70 minutes          Scribe Attestation  By signing my name below, I, Jennifer Austin, Scribe   attest that this documentation has been prepared under the direction and in the presence of Chandrika Bedolla DO.

## 2024-09-09 NOTE — PATIENT INSTRUCTIONS
AFTER VISIT SUMMARY      Date: 9/9/2024  Appointment with Psychiatrist - Dr. Bedolla      Reason for Visit:  -Routine follow-up/Medication management      Discussed during Appointment:   -Physical health, psychiatric/behavioral symptoms, daily functioning, new issues/concerns     -Treatment plan/management, including medication  -Home sleep apnea test      Clinical Impression/Status Update:  Report of improved sleep since roommate causing household disruption left the house.  Patient's result of home sleep apnea test showed severe obstructive sleep apnea and patient has been referred to sleep medicine by PCP.  Discussed with manager factors that may predispose to sleep apnea.  Patient requires an EKG and recommended patient getting it done at sleep medicine appointment.    -Current medication regimen appears to be appropriately effective without experiencing significant or bothersome side effects.  --We will continue current medications and see him again in 2 months.  --Prior to next visit, please get his EKG done.    -Risk/Benefit assessment:   Medical necessity for continued treatment with psychotropic medication:   There is no report of signs/symptoms consistent with medication-induced impairment in daily functioning. At this time, benefits of medication felt to outweigh potential risks. But we will continue to reassess need for psychotropic medication at regular 3 to 6 month intervals, or sooner as clinically indicated.      #Clinic Policies/Procedures  -Refills  Prescriptions will be sent to your pharmacy with enough refills to last until your next appointment. For established patients, we typically provide 6 refills for regular meds and 3 refills for controlled substances (e.g., ADHD stimulant medication, benzodiazepines such as lorazepam). We will not provide additional refills beyond that without having an appointment. You can schedule an appointment by sending a [employer] message or calling our office at  [phone].     -Paperwork Requests (e.g., Expert Eval for guardianship)  We ask that you please schedule an appointment if needing paperwork filled out by the doctor (e.g., Expert Eval, FMLA form).  Please provide as much information as possible about your request and email the doctor any forms needing to be filled out prior to the appointment.       ===========================  INSTRUCTIONS/RECOMMENDATIONS  ===========================    #Medication   -No medication changes made today  --Continue taking your psych medications the same as usual    --Refills will be sent to your pharmacy      >>For prior authorization issues at the pharmacy -> Call the office and ask to talk to Nurse Rangel.      #To-Do prior to Next Visit  EKG - consider getting it done at his sleep medicine appointment.       [facility] - Online Patient Portal   For Help activating your [facility] Account or setting up Proxy Access, please call the dedicated Patient Support Line at [phone].    If having technical Issues with Epic  or [facility]-> Please help us improve the Epic experience  To help identify issues needing to be fixed and improve patient care, please report any issues you experience with Epic or  [facility], such as difficulty connecting to video during virtual visits.  [phone]      #Follow-up  --Your next appointment is scheduled for 11/19/2024 at 3:00pm (virtual visit with Epic)    -We would like to see you again in about 2 months  --> Please call the office ([phone]) to schedule an appointment at your earliest convenience.    *If having new/worsening symptoms, please send a [facility] message or call the clinic ([phone]) to discuss being seen sooner   >>If unable to reach the office, send me an email at [email or web address]

## 2024-09-13 DIAGNOSIS — K59.09 CHRONIC CONSTIPATION: ICD-10-CM

## 2024-09-13 DIAGNOSIS — E55.9 VITAMIN D DEFICIENCY: ICD-10-CM

## 2024-09-13 RX ORDER — CHOLECALCIFEROL (VITAMIN D3) 25 MCG
TABLET ORAL DAILY
Qty: 30 TABLET | Refills: 0 | Status: SHIPPED | OUTPATIENT
Start: 2024-09-13

## 2024-09-13 RX ORDER — DOCUSATE SODIUM 100 MG/1
CAPSULE, LIQUID FILLED ORAL
Qty: 30 CAPSULE | Refills: 0 | Status: SHIPPED | OUTPATIENT
Start: 2024-09-13

## 2024-09-17 PROBLEM — Z86.59 HISTORY OF DYSTHYMIC DISORDER: Status: ACTIVE | Noted: 2024-09-17

## 2024-09-19 DIAGNOSIS — F51.05 INSOMNIA DUE TO OTHER MENTAL DISORDER: ICD-10-CM

## 2024-09-19 DIAGNOSIS — F29 PSYCHOSIS, UNSPECIFIED PSYCHOSIS TYPE (MULTI): ICD-10-CM

## 2024-09-19 DIAGNOSIS — F42.2 MIXED OBSESSIONAL THOUGHTS AND ACTS: ICD-10-CM

## 2024-09-19 DIAGNOSIS — F41.1 GAD (GENERALIZED ANXIETY DISORDER): ICD-10-CM

## 2024-09-19 DIAGNOSIS — F99 INSOMNIA DUE TO OTHER MENTAL DISORDER: ICD-10-CM

## 2024-09-19 DIAGNOSIS — F32.9 MAJOR DEPRESSIVE DISORDER, REMISSION STATUS UNSPECIFIED, UNSPECIFIED WHETHER RECURRENT: ICD-10-CM

## 2024-09-19 RX ORDER — OLANZAPINE 15 MG/1
TABLET ORAL
Qty: 30 TABLET | Refills: 6 | Status: SHIPPED | OUTPATIENT
Start: 2024-09-19

## 2024-09-19 RX ORDER — ESCITALOPRAM OXALATE 20 MG/1
TABLET ORAL
Qty: 30 TABLET | Refills: 6 | Status: SHIPPED | OUTPATIENT
Start: 2024-09-19

## 2024-09-19 RX ORDER — TRAZODONE HYDROCHLORIDE 100 MG/1
TABLET ORAL
Qty: 30 TABLET | Refills: 6 | Status: SHIPPED | OUTPATIENT
Start: 2024-09-19

## 2024-09-19 RX ORDER — ACETAMINOPHEN 500 MG
TABLET ORAL
Qty: 60 TABLET | Refills: 6 | Status: SHIPPED | OUTPATIENT
Start: 2024-09-19

## 2024-09-19 RX ORDER — ESCITALOPRAM OXALATE 10 MG/1
TABLET ORAL
Qty: 30 TABLET | Refills: 6 | Status: SHIPPED | OUTPATIENT
Start: 2024-09-19

## 2024-09-19 RX ORDER — METOPROLOL TARTRATE 50 MG/1
TABLET ORAL
Qty: 60 TABLET | Refills: 6 | Status: SHIPPED | OUTPATIENT
Start: 2024-09-19

## 2024-10-17 NOTE — PROGRESS NOTES
Patient: Ede Zurita  : 1965 AGE: 59 y.o. SEX:male   MRN: [id number]   Provider: BENNETT Watson     Location [facility]   Service Date: 10/24/2024     PCP: Iza Sherman MD   Referred by: Iza Sherman MD          [facility]  New Visit Note      HISTORY OF PRESENT ILLNESS     Ede Zurita is a 59 y.o. male with a h/o  MIRTA, hypertension, hyperlipidemia, TMJ, GERD, BPH, anxiety, depression, stereotypic movement disorder, intellectual disability, autism spectrum disorder, eczema, obesity  who presents to [facility].    10/24/2024: NPV with concerns of MIRTA management. Patient presents with Estiven (medical care coordinator at [facility] where he has resided for 20+ years). Most of history obtained from Estiven who reports he always snores very loud and is tired throughout the day. ---> start APAP 5-20 CWP      SLEEP STUDY HISTORY (personally reviewed raw data such as interpretation report, data sheet, hypnogram, and titration table if available and applicable)  -HSAT 2024-showing severe MIRTA with AHI 3% 56.5, AHI 4% 44.8, SpO2 zeinab 68.7%.  SpO2 <= 88% for 52.3 minutes    SLEEP-WAKE SCHEDULE    Sleep Patterns: He does not have a usual bed partner. In terms of the patient's sleep/wake cycle, he generally gets into bed at approximately 8 PM.  his latency to sleep onset after lights out is quick. During the night, the patient generally awakens 1 times nightly. These awakenings are usually brief in duration. Final wake time on weekday mornings is around 6 AM.    Compared to weekdays, the patient's sleep schedule is  similar on the weekends.    Breathing during sleep: snoring and witnessed apneas  Behaviors at night: No   Sleep paralysis: No   Hypnogogic or hypnopompic hallucinations: No   Cataplexy: No     Leg symptoms and timing:  - Sensations: Patient does not have unusual sensations in their extremities that cause an  urge to move them   - Movement: Patient has not been told that their legs kick or jerk during sleep    Daytime Symptoms:  On awakening patient reports: wake unrefreshed, feels sleepy, and hard to get out of bed  Patient report some daytime symptoms including: DAYTIME SYMPTOMS: reports sleep inertia    Sleep environment:  Preferred sleep position: back  Room is dark: Yes  Room is quiet: Yes  Room is cool: Yes  Bed comfort: good    SLEEP HABITS  Caffeine consumption: Yes, rarely (occasional)  Alcohol consumption: No  Smoking: No  Marijuana: No  Sleep aids: melatonin     WEIGHT: stable    ESS: 3    REVIEW OF SYSTEMS     All other systems have been reviewed and are negative.    ALLERGIES     No Known Allergies    MEDICATIONS     Current Outpatient Medications   Medication Sig Dispense Refill    acetaminophen (Tylenol) 325 mg tablet Take 2 tablets (650 mg) by mouth every 6 hours if needed for mild pain (1 - 3), moderate pain (4 - 6), fever (temp greater than 38.0 C) or headaches.      amLODIPine (Norvasc) 5 mg tablet TAKE 1 TAB BY MOUTH ONCE DAILY FOR BLOOD PRESSURE 30 tablet 10    diaper,brief,adult,disposable (Depend Real Fit Brief Men L/XL) misc 1 each if needed (incontinence). 96 each 11    docusate sodium (Colace) 100 mg capsule TAKE 1 CAP BY MOUTH ONCE DAILY - HOLD IF DIARRHEA OCCURS 30 capsule 0    dorzolamide-timoloL (Cosopt) 22.3-6.8 mg/mL ophthalmic solution Administer 1 drop into both eyes.      dorzolamide-timoloL (Cosopt) 22.3-6.8 mg/mL ophthalmic solution 1 drop 2 times a day.      erythromycin (Romycin) 5 mg/gram (0.5 %) ophthalmic ointment Apply to both eyes.      escitalopram (Lexapro) 10 mg tablet Take 1 tablet (10mg) scheduled once daily at bedtime for mood. *Take in addition to Escitalopram 20mg tablet for total dose 30mg. 30 tablet 6    escitalopram (Lexapro) 20 mg tablet Take 1 tablet (20mg) scheduled once daily at bedtime for mood. *Take in addition to Escitalopram 10mg tablet for total dose 30mg.  30 tablet 6    latanoprost (Xalatan) 0.005 % ophthalmic solution 1 drop.      latanoprost (Xalatan) 0.005 % ophthalmic solution 1 drop once daily at bedtime.      melatonin 5 mg tablet Take 2 tablets (10mg) scheduled once daily at bedtime for sleep. 60 tablet 6    metoprolol tartrate (Lopressor) 50 mg tablet Take scheduled twice daily: Take 1 tablet (50mg) in the morning (7:00) and take 1 tablet (50mg) in the evening (17:00) for anxiety. *Take blood pressure and pulse twice daily before administering medication. Hold and notify nursing if blood pressure is less than 90/60 or if pulse is less than 60. 60 tablet 6    OLANZapine (ZyPREXA) 15 mg tablet Take 1 tablet (15mg) scheduled once daily at bedtime for psychosis. 30 tablet 6    omeprazole (PriLOSEC) 40 mg DR capsule Take 1 capsule (40 mg) by mouth once daily.      pediatric multivitamin (Children's Multivitamin) tablet,chewable chewable tablet Chew 1 tablet once daily. CHEW AND SWALLOW 30 each 10    pimecrolimus (Elidel) 1 % cream Use twice a day around mouth and nose to rash. Once gone, can stop. 60 g 1    polyethylene glycol (Glycolax, Miralax) 17 gram packet Take 17 g by mouth once daily.      simvastatin (Zocor) 20 mg tablet TAKE 1 TAB BY MOUTH ONCE DAILY 30 tablet 10    tamsulosin (Flomax) 0.4 mg 24 hr capsule Take 1 capsule (0.4 mg) by mouth once daily at bedtime. 90 capsule 3    traZODone (Desyrel) 100 mg tablet Take 1 tablet (100mg) scheduled once daily at bedtime for sleep. 30 tablet 6    Vitamin D3 25 mcg (1,000 unit) tablet TAKE 1 TAB BY MOUTH ONCE DAILY 30 tablet 0     No current facility-administered medications for this visit.       PAST HISTORIES     PERTINENT PAST MEDICAL HISTORY: See HPI    PERTINENT PAST SURGICAL HISTORY for Sleep Medicine:  non-contributory    PERTINENT FAMILY HISTORY for Sleep Medicine:  Unknown    PERTINENT SOCIAL HISTORY:  He  reports that he has never smoked. He has never been exposed to tobacco smoke. He has never used  "smokeless tobacco. He reports that he does not drink alcohol and does not use drugs. [facility] at [facility].    Active Problems, Allergy List, Medication List, and PMH/PSH/FH/Social Hx have been reviewed and reconciled in chart. No significant changes unless documented in the pertinent chart section. Updates made when necessary.     PHYSICAL EXAM     VITAL SIGNS: /68   Pulse 80   Resp 18   Wt 103 kg (226 lb 6.4 oz)   BMI 35.99 kg/m²     CURRENT WEIGHT:   Vitals:    10/24/24 0905   Weight: 103 kg (226 lb 6.4 oz)      PREVIOUS WEIGHTS:  Wt Readings from Last 3 Encounters:   10/24/24 103 kg (226 lb 6.4 oz)   05/09/24 97.6 kg (215 lb 3.2 oz)   01/22/24 94.8 kg (209 lb)     Physical Exam  Constitutional: Awake, not in distress  Lungs: Clear to auscultation bilateral, no cough noted  Heart: Regular rate and rhythm  Skin: Warm, no rash  Neuro: No tremors, moves all extremities  Psych: alert and oriented to time, place, and person    HEENT:   Tonsils enlargement grade 2+   Airway comments: narrow lateral walls   Large tongue    Tongue scalloping: yes   Modified Mallampati score - 3    RESULTS/DATA     No results found for: \"IRON\", \"TRANSFERRIN\", \"IRONSAT\", \"TIBC\", \"FERRITIN\"    Bicarbonate   Date Value Ref Range Status   02/19/2024 28 21 - 32 mmol/L Final       ASSESSMENT/PLAN     Mr. Zurita is a 59 y.o. male and He was referred to the [facility] for evaluation of MIRTA    Problem List, Orders, Assessment, Recommendations:    #MIRTA, severe  -HSAT 5/16/2024-showing severe MIRTA with AHI 3% 56.5, AHI 4% 44.8, SpO2 zeinab 68.7%.  SpO2 <= 88% for 52.3 minutes  - discussed initiating APAP vs. Obtaining dedicated PAP titration study- would like to start with APAP first  - will start APAP 5-20 cwp via DME- [facility] (PLEASE EXPEDITE DUE TO MIRTA SEVERITY)   - would consider PAP titration or overnight pulse ox at follow up visit   -Sleep apnea, PAP therapy education as well as the tips to be " successful with PAP treatment was provided at length in clinic today. Patient verbalized understanding.  - Discussed 30-day mask guarantee and insurance requirement regarding PAP compliance and follow-up.   -Diet, exercise, and weight loss were emphasized today in clinic, as were non-supine sleep, avoiding alcohol in the late evening, and driving or operating heavy machinery when sleepy.   - patient will follow-up in 2-3 months and bring equipment to the follow-up clinic        #DEPRESSION / ANXIETY/AUTISM  - denies any SI, HI or hallucinations   - currently on meds, well controlled  - defer management to PCP / Psychiatry     #HTN  BP Readings from Last 1 Encounters:   10/24/24 103/68     - doing well, asymptomatic, denies any headache, blurry vision, chest pain, palpitation, dizziness, lightheadedness, or syncopal episodes  - discussed at length the impact of untreated MIRTA and BP control  - supportive management: low salt DASH diet (less than 2000 mg sodium intake daily), moderate intensity aerobic exercise at least 30 minutes 5 days per week, reduce stress, quit smoking, limit alcohol, lose weight, and monitor BP once daily  - continue current management and follow-up with PCP     #Obesity  BMI Readings from Last 1 Encounters:   10/24/24 35.99 kg/m²     - Encouraged healthy weight loss via diet and exercise  - Weight loss can help in the long term treatment of MIRTA.  - Defer management to PCP     All of patient's questions were answered. He verbalizes understanding and agreement with my assessment and plan.    Disposition    Return to clinic in 3 months    I personally spent 45 minutes today (exclusive of procedures) providing care for this patient, including preparation, face to face time, EMR documentation and other services such as review of medical records, diagnostic results, patient education, counseling, and coordination of care.

## 2024-10-23 PROBLEM — E66.9 OBESITY (BMI 30-39.9): Status: ACTIVE | Noted: 2024-10-23

## 2024-10-23 PROBLEM — G47.33 OSA (OBSTRUCTIVE SLEEP APNEA): Status: ACTIVE | Noted: 2024-10-23

## 2024-10-24 ENCOUNTER — APPOINTMENT (OUTPATIENT)
Dept: SLEEP MEDICINE | Facility: CLINIC | Age: 59
End: 2024-10-24
Payer: MEDICARE

## 2024-10-24 VITALS
WEIGHT: 226.4 LBS | RESPIRATION RATE: 18 BRPM | BODY MASS INDEX: 35.99 KG/M2 | SYSTOLIC BLOOD PRESSURE: 103 MMHG | HEART RATE: 80 BPM | DIASTOLIC BLOOD PRESSURE: 68 MMHG

## 2024-10-24 DIAGNOSIS — E66.9 OBESITY (BMI 30-39.9): ICD-10-CM

## 2024-10-24 DIAGNOSIS — Z78.9 NONSMOKER: ICD-10-CM

## 2024-10-24 DIAGNOSIS — F84.0 AUTISM SPECTRUM DISORDER (HHS-HCC): ICD-10-CM

## 2024-10-24 DIAGNOSIS — I10 BENIGN ESSENTIAL HYPERTENSION: ICD-10-CM

## 2024-10-24 DIAGNOSIS — G47.33 OSA (OBSTRUCTIVE SLEEP APNEA): Primary | ICD-10-CM

## 2024-10-24 PROCEDURE — 1036F TOBACCO NON-USER: CPT | Performed by: NURSE PRACTITIONER

## 2024-10-24 PROCEDURE — G2211 COMPLEX E/M VISIT ADD ON: HCPCS | Performed by: NURSE PRACTITIONER

## 2024-10-24 PROCEDURE — 3074F SYST BP LT 130 MM HG: CPT | Performed by: NURSE PRACTITIONER

## 2024-10-24 PROCEDURE — 99214 OFFICE O/P EST MOD 30 MIN: CPT | Performed by: NURSE PRACTITIONER

## 2024-10-24 PROCEDURE — 3078F DIAST BP <80 MM HG: CPT | Performed by: NURSE PRACTITIONER

## 2024-10-24 RX ORDER — DORZOLAMIDE HYDROCHLORIDE AND TIMOLOL MALEATE 20; 5 MG/ML; MG/ML
1 SOLUTION/ DROPS OPHTHALMIC 2 TIMES DAILY
COMMUNITY

## 2024-10-24 RX ORDER — POLYETHYLENE GLYCOL 3350 17 G/17G
17 POWDER, FOR SOLUTION ORAL DAILY
COMMUNITY

## 2024-10-24 RX ORDER — LATANOPROST 50 UG/ML
1 SOLUTION/ DROPS OPHTHALMIC NIGHTLY
COMMUNITY

## 2024-10-24 ASSESSMENT — COLUMBIA-SUICIDE SEVERITY RATING SCALE - C-SSRS
1. IN THE PAST MONTH, HAVE YOU WISHED YOU WERE DEAD OR WISHED YOU COULD GO TO SLEEP AND NOT WAKE UP?: NO
2. HAVE YOU ACTUALLY HAD ANY THOUGHTS OF KILLING YOURSELF?: NO
6. HAVE YOU EVER DONE ANYTHING, STARTED TO DO ANYTHING, OR PREPARED TO DO ANYTHING TO END YOUR LIFE?: NO

## 2024-10-24 ASSESSMENT — SLEEP AND FATIGUE QUESTIONNAIRES
HOW LIKELY ARE YOU TO NOD OFF OR FALL ASLEEP WHILE LYING DOWN TO REST IN THE AFTERNOON WHEN CIRCUMSTANCES PERMIT: SLIGHT CHANCE OF DOZING
HOW LIKELY ARE YOU TO NOD OFF OR FALL ASLEEP WHEN YOU ARE A PASSENGER IN A CAR FOR AN HOUR WITHOUT A BREAK: MODERATE CHANCE OF DOZING
HOW LIKELY ARE YOU TO NOD OFF OR FALL ASLEEP WHILE SITTING QUIETLY AFTER LUNCH WITHOUT ALCOHOL: WOULD NEVER DOZE
HOW LIKELY ARE YOU TO NOD OFF OR FALL ASLEEP WHILE WATCHING TV: WOULD NEVER DOZE
HOW LIKELY ARE YOU TO NOD OFF OR FALL ASLEEP WHILE SITTING AND TALKING TO SOMEONE: WOULD NEVER DOZE
SITING INACTIVE IN A PUBLIC PLACE LIKE A CLASS ROOM OR A MOVIE THEATER: WOULD NEVER DOZE
HOW LIKELY ARE YOU TO NOD OFF OR FALL ASLEEP IN A CAR, WHILE STOPPED FOR A FEW MINUTES IN TRAFFIC: WOULD NEVER DOZE
ESS-CHAD TOTAL SCORE: 3
HOW LIKELY ARE YOU TO NOD OFF OR FALL ASLEEP WHILE SITTING AND READING: WOULD NEVER DOZE

## 2024-10-24 ASSESSMENT — ENCOUNTER SYMPTOMS
DEPRESSION: 0
LOSS OF SENSATION IN FEET: 0
OCCASIONAL FEELINGS OF UNSTEADINESS: 0

## 2024-10-24 NOTE — PATIENT INSTRUCTIONS
[facility] Sleep Medicine   [facility]  [address] [address]  [phone]       NAME: Ede Zurita   DATE: 10/24/24    Your Sleep Provider Today: BENNETT Watson  Your Primary Care Physician: Iza Sherman MD       DIAGNOSIS:   1. MIRTA (obstructive sleep apnea)  Referral to Adult Sleep Medicine      2. Benign essential hypertension        3. Autism spectrum disorder ([facility])        4. Obesity (BMI 30-39.9)            Thank you for coming to the Sleep Medicine Clinic today! Your sleep medicine provider today was: BENNETT Watson Below is a summary of your treatment plan, other important information, and our contact numbers:      TREATMENT PLAN     - Follow-up in 3 months.  - If not already done, sign up for 'My Chart' and send prescription requests or messages through this  - CPAP ordered through [facility]    Obstructive sleep apnea (MIRTA): MIRTA is a sleep disorder where your upper airway muscles relax during sleep and the airway intermittently and repetitively narrows and collapses leading to blocked airway (apnea) which, in turn, can disrupt breathing in sleep, lower oxygen levels while you sleep and cause night time wakings. Because apnea may cause higher carbon dioxide or low oxygen levels, untreated MIRTA can lead to heart arrhythmia, elevation of blood pressure, and make it harder for the body to consolidate memory and metabolize (leading to higher blood sugars at night).   Frequent partial arousals occur during sleep resulting in sleep deprivation and daytime sleepiness. MIRTA is associated with an increased risk of cardiovascular disease, stroke, hypertension, and insulin resistance. Moreover, untreated MIRTA with excessive daytime sleepiness can increase the risk of motor vehicular accidents.    Some conservative strategies for MIRTA are:   Positional therapy - Avoid sleeping on your back.   Healthy diet, exercise, and  "optimizing weight encouraged.   Avoid alcohol late in the evening as it can make sleep apnea worse.     Safety: Avoid driving and operating heavy equipment while sleepy. Drowsy driving may lead to life-threatening motor vehicle accidents.     Common treatment options for sleep apnea include weight management, positional therapy, Positive Airway Therapy (PAP) therapy, oral appliance therapy, hypoglossal nerve stimulation, and select airway surgeries.    Starting Positive Airway Pressure: You were ordered a device to wear when you sleep called PAP (Positive Airway Pressure) to treat your sleep apnea. The order will be submitted to a durable medical equipment company who will arrange setting you up with the device. They will provide all the necessary equipment and discuss use and maintenance of the device with you.     **Please bring all PAP equipment with you to follow up appointments unless told otherwise.**           Important things to keep in mind as you start PAP    Insurance will monitor your usage during the first 90 days.  You should use your PAP - \"all night, every night\", for your health. The bare minimum is to use your PAP device while sleeping = at least 4 hours per day at least 5 days per week. Otherwise, your PAP device may be reclaimed by your PAP vendor at 90 days.  There are many mask to choose from to wear with your PAP machine. If you are not comfortable with the first mask issued to you, call your DME and ask for another option to try (within the first 30 days).  Discuss with your provider if you are having issues breathing with the machine or the temperature or humidity feel uncomfortable.  Expect to have an adjustment period when you start your device. It helps to continuing wearing the machine every day for a period of time until you get more used to it. You can practice with wearing the mask alone if you need, then add in the PAP air pressure a few days later.   Reach out for help if you are " struggling! The sleep medicine department can be reached at [phone]  We encourage you to download data monitoring apps to your phone. For CLASEMOVIL 10/11 - MyAir eric. For Spiracur - DreamMapper. Both are available in the Eric store for free and are a great tool to monitor your progress with your CPAP night to night.    IF YOU ARE HAVING TROUBLE GETTING USED TO YOUR PAP DEVICE    The following steps are recommended to help you get accustomed to using CPAP and improve your CPAP usage at home:    Use CPAP every night for 5 to 10 minutes while awake in the evening without fail.  Be sure to remain awake while breathing on the nasal mask for the first 1 to 2 weeks.  After 2 to 4 weeks, start using CPAP at night when you go to sleep…But be sure to take the mask off if you have not fallen asleep in 5 to 10 minutes, or if you fall asleep.  Take the mask off whenever you become aware of it or the moment you wake up.   Continue this process every night, with confidence that you will gradually become accustomed to using CPAP over time.    Be sure you are using a comfortable mask, and that you are applying it snugly (but not too tight).    Common issues with PAP machine:  Mask gets dislodged when turning to the side: Consider getting a CPAP pillow or switching to a mask with hose on top.     Dry mouth:  Your machine has built-in humidifier that heats up the air to prevent dry mouth. It can be adjusted to your comfort. You can try that first and increase setting one level one night at a time to check which setting is comfortable and effective in lessening dry mouth. If dry mouth persists despite humidity setting adjustment, may apply OTC Biotene gel over the gums at bedtime.  If Biotene gel is not effective, consider trying XEROSTOM gel from [employer].  If using nasal pillows or nasal mask, consider adding chinstrap or mouth tape to keep your mouth closed while you sleep. Also, eliminate or reduce dose of  "meds that can cause dry mouth if possible. Lastly, may try getting a separate room humidifier machine.    Airleaks: Please call DME as they may need to adjust your mask or refit you with a different kind of mask. In addition, you can ask DME for tips on getting a good mask seal and mask fit.     Difficulty tolerating the mask: Contact your DME to try a different kind of mask and/or call office to get a referral to Sleep Psychologist for CPAP desensitization. CPAP desensitization technique is a set of strategies that helps patient cope with claustrophobia and anxiety related to wearing mask. Alternatively, we can do a daytime mini-sleep study called PAP-nap trial wherein you will try on different kinds of mask and the sleep technician will try different pressure settings on CPAP and BPAP machines to see which specific pressure is tolerable and comfortable for you.     Water droplets or moisture within the hose and/or mask: This is called rain-out and it is caused by condensation of too much heated humidity on the cooler walls of the hose. If you have rain-out, turn down/decrease your humidity setting or get a heated hose. If you already have a heated hose, turn up the \"tube temperature\" of the heated hose. Alternatively, if you don't want to get a heated hose or warmer air, may wrap the CPAP hose with stockings to keep it somewhat warm. Also, you need to place the machine on the floor and lower the hose so that water won't travel upward towards your mask.     Maintaining your CPAP/BPAP device:    The humidification chamber (aka water tank or water chamber) needs to be filled with distilled water to prevent buildup of white deposits in the future. If you cannot find distilled water, you can use tap water but expect to have white deposits buildup seen after prolonged use with tap water. If you start seeing white deposits on the water chamber, you can clean it by filling it with equal parts of distilled white vinegar " and water. Let the vinegar-water mixture sit for 2 hours, and then rinse it with running tap water. Clean with soap and water then let it dry.     You should try to keep your machine clean in order to work well. Here are some tips to clean PAP supplies / accessories:    Clean the humidification chamber (aka water tank) as well as your mask and tubing at least once a week with soap and water.   Alternatively, you can fill a sink or basin with warm water and add a little mild detergent, like Ivory dish soap. Gently wipe your supplies with the soapy water to free all the oils and dirt that may have collected. Once that's done, rinse these items with clean water until the soap is gone and let them air dry. You can hang your tubing over the curtain katiuska in your bathroom so that it dries.  The mask insert (part of the mask that has contact with your skin) needs to be cleaned with soap and water daily. Another option is to wipe them down with CPAP wipes or baby wipes.    You should replace your mask and tubing frequently in order to prevent bacteria buildup, machine damage, and mask seal issues. The older the mask and hose, the high likelihood that there is bacteria buildup in it especially if they are not cleaned regularly. Dirty filters damage machines because build-up of dust and contaminants can cause machine to over-heat, and in time, damage the motor of machine. Cushions lose their seal over time as most masks are made of plastic and silicone while headgear is made of neoprene. These materials will break down with age and frequent use. Here is the recommended replacement schedule for PAP supplies / accessories:    Twice a month- disposable filters and cushions for nasal mask or nasal pillows.  Once a month- cushion for full face mask  Every 3 months- mask with headgear and PAP tubing (standard or heated hose)  Every 6 months- reusable filter, water chamber, and chin strap     Other useful information:    Some people  do not put water in the tank while other people prefers to put water in the tank to prevent mouth dryness. Try to experiment to determine which is more comfortable for you.   In general, new machines have 2 years warranty on parts while health insurance allows you to have a new machine once every 5 years.     You can also go to the following EDUCATION WEBSITES for further information:   American Academy of Sleep Medicine http://sleepeducation.org  National Sleep Foundation: https://sleepfoundation.org  American Sleep Apnea Association: https://www.sleepapnea.org (for patients with sleep apnea)    Here at [facility], we wish you a restful sleep!       IMPORTANT INFORMATION     Call 911 for medical emergencies.  Our offices are generally open from Monday-Friday, 9 am - 5 pm.  If you need to get in touch with me, you may either call me/my team (number is below) or you can use [facility].  If a referral for a test, for CPAP, or for another specialist was made, and you have not heard about scheduling this within a week, please call scheduling at [phone] ([phone]).  If you are unable to make your appointment for clinic or an overnight study, kindly call the office at least 48 hours in advance to cancel and reschedule.  If you are on CPAP, please bring your device's card or the device to each clinic appointment.   There are no supporting services by either the sleep doctors or their staff on weekends and Holidays, or after 5 PM on weekdays.     PRESCRIPTIONS     We require 7 days advanced notice for prescription refills. If we do not receive the request in this time, we cannot guarantee that your medication will be refilled in time.      IMPORTANT PHONE NUMBERS     [facility] Sleep [facility]: [phone]  [facility] (DME): [phone]  Mobile Messenger (DME): [phone]  [facility] (DME): [phone]    CONTACTING YOUR SLEEP MEDICINE PROVIDER AND SLEEP TEAM      For issues with your  "machine or mask interface, please call your DME provider first. DME stands for durable medical company. DME is the company who provides you the machine and/or PAP supplies / accessories.   To schedule, cancel, or reschedule SLEEP STUDY APPOINTMENTS, please call the Main Phone Line at [phone] ([phone] - option 3.   To schedule, cancel, or reschedule CLINIC APPOINTMENTS, you can do it in \"Exit41hart\", call [phone] for [facility] office , [phone] for [place] office to speak with my on site staff, or call the Main Phone Line at [phone] - option 2  For CLINICAL QUESTIONS or MEDICATION REFILLS, please call direct line for Adult Sleep Nurses at [phone].   Lastly, you can also send a message directly to your provider through \"My Chart\", which is the email service through your  Records Account: [email or web address]     Adult Sleep Nurses (Herminia Jones RN and Tootie Noel RN):  For clinical questions and refilling prescriptions: [phone]  Email sleep diaries and other documents at: [email or web address]    Office locations for Leah De La Torre NP:    [address]   [address]  [place], [place] [address] [phone]    [address]  [address]  [place] [address]  [phone]    [address]  [address]  [place] [address]   [phone]    OUR SLEEP TESTING LOCATIONS     Our team will contact you to schedule your sleep study, however, you can contact us as follow:  Main Phone Line (scheduling only): [phone] ([phone]), option 3    Sleep Testing Locations:   [place] (18 years and older): [address] [place] [address], 2nd floor   [place] (18 years and older): [address] [facility] [facility]; 4th floor  After hours line: [phone]  [facility] (18 years and older) at [place]: [address] [facility]  After hours line: [phone]   [facility] (Main campus: All ages): [place], 6th floor. After " hours line: [phone]   [place] (5 years and older; younger considered on case-by-case basis): [address] [facility]; [employer] [address], [address]. Scheduling  After hours line: [phone]       Here at [facility], we wish you a restful sleep!    Your sleep medicine provider for this visit was: Leah De La Torre, APRN-CNP

## 2024-11-07 ENCOUNTER — APPOINTMENT (OUTPATIENT)
Dept: PRIMARY CARE | Facility: CLINIC | Age: 59
End: 2024-11-07
Payer: COMMERCIAL

## 2024-11-07 VITALS
RESPIRATION RATE: 16 BRPM | BODY MASS INDEX: 35.56 KG/M2 | DIASTOLIC BLOOD PRESSURE: 80 MMHG | OXYGEN SATURATION: 93 % | SYSTOLIC BLOOD PRESSURE: 134 MMHG | HEIGHT: 67 IN | TEMPERATURE: 97.9 F | WEIGHT: 226.6 LBS | HEART RATE: 78 BPM

## 2024-11-07 DIAGNOSIS — E66.01 SEVERE OBESITY (BMI 35.0-39.9) WITH COMORBIDITY (MULTI): ICD-10-CM

## 2024-11-07 DIAGNOSIS — R60.9 EDEMA, UNSPECIFIED TYPE: ICD-10-CM

## 2024-11-07 DIAGNOSIS — Z12.5 SCREENING FOR PROSTATE CANCER: ICD-10-CM

## 2024-11-07 DIAGNOSIS — E55.9 VITAMIN D DEFICIENCY: ICD-10-CM

## 2024-11-07 DIAGNOSIS — R06.02 SOB (SHORTNESS OF BREATH): ICD-10-CM

## 2024-11-07 DIAGNOSIS — Z00.00 ROUTINE GENERAL MEDICAL EXAMINATION AT HEALTH CARE FACILITY: Primary | ICD-10-CM

## 2024-11-07 DIAGNOSIS — F84.0 AUTISM SPECTRUM DISORDER (HHS-HCC): ICD-10-CM

## 2024-11-07 DIAGNOSIS — R53.83 OTHER FATIGUE: ICD-10-CM

## 2024-11-07 DIAGNOSIS — I10 BENIGN ESSENTIAL HYPERTENSION: ICD-10-CM

## 2024-11-07 PROBLEM — R39.9 UTI SYMPTOMS: Status: RESOLVED | Noted: 2023-05-23 | Resolved: 2024-11-07

## 2024-11-07 PROCEDURE — 3008F BODY MASS INDEX DOCD: CPT | Performed by: INTERNAL MEDICINE

## 2024-11-07 PROCEDURE — 1036F TOBACCO NON-USER: CPT | Performed by: INTERNAL MEDICINE

## 2024-11-07 PROCEDURE — 99214 OFFICE O/P EST MOD 30 MIN: CPT | Performed by: INTERNAL MEDICINE

## 2024-11-07 PROCEDURE — 90656 IIV3 VACC NO PRSV 0.5 ML IM: CPT | Performed by: INTERNAL MEDICINE

## 2024-11-07 PROCEDURE — G0439 PPPS, SUBSEQ VISIT: HCPCS | Performed by: INTERNAL MEDICINE

## 2024-11-07 PROCEDURE — 3079F DIAST BP 80-89 MM HG: CPT | Performed by: INTERNAL MEDICINE

## 2024-11-07 PROCEDURE — 3075F SYST BP GE 130 - 139MM HG: CPT | Performed by: INTERNAL MEDICINE

## 2024-11-07 PROCEDURE — G0008 ADMIN INFLUENZA VIRUS VAC: HCPCS | Performed by: INTERNAL MEDICINE

## 2024-11-07 RX ORDER — HYDROCHLOROTHIAZIDE 12.5 MG/1
12.5 TABLET ORAL DAILY
Qty: 30 TABLET | Refills: 0 | Status: SHIPPED | OUTPATIENT
Start: 2024-11-07 | End: 2024-11-08

## 2024-11-07 RX ORDER — AMMONIUM LACTATE 12 G/100G
CREAM TOPICAL
COMMUNITY
Start: 2023-12-06

## 2024-11-07 RX ORDER — KETOCONAZOLE 20 MG/G
CREAM TOPICAL
COMMUNITY
Start: 2024-08-22

## 2024-11-07 ASSESSMENT — ENCOUNTER SYMPTOMS
MYALGIAS: 0
POLYPHAGIA: 0
CHILLS: 0
SORE THROAT: 0
ARTHRALGIAS: 0
EYE PAIN: 0
HEADACHES: 0
WOUND: 0
FREQUENCY: 0
DYSURIA: 0
BLOOD IN STOOL: 0
PALPITATIONS: 0
CONSTIPATION: 0
ABDOMINAL PAIN: 0
NAUSEA: 0
DIARRHEA: 0
POLYDIPSIA: 0
COUGH: 0
DIZZINESS: 0
VOMITING: 0
DYSPHORIC MOOD: 0
SHORTNESS OF BREATH: 1
NERVOUS/ANXIOUS: 0
FATIGUE: 1
HEMATURIA: 0
UNEXPECTED WEIGHT CHANGE: 0
WHEEZING: 0
RHINORRHEA: 0
CHEST TIGHTNESS: 0
FEVER: 0

## 2024-11-07 ASSESSMENT — PATIENT HEALTH QUESTIONNAIRE - PHQ9
1. LITTLE INTEREST OR PLEASURE IN DOING THINGS: NOT AT ALL
2. FEELING DOWN, DEPRESSED OR HOPELESS: NOT AT ALL
2. FEELING DOWN, DEPRESSED OR HOPELESS: NOT AT ALL
SUM OF ALL RESPONSES TO PHQ9 QUESTIONS 1 AND 2: 0
SUM OF ALL RESPONSES TO PHQ9 QUESTIONS 1 AND 2: 0
1. LITTLE INTEREST OR PLEASURE IN DOING THINGS: NOT AT ALL

## 2024-11-07 ASSESSMENT — ACTIVITIES OF DAILY LIVING (ADL)
DRESSING: INDEPENDENT
BATHING: INDEPENDENT
GROCERY_SHOPPING: NEEDS ASSISTANCE
MANAGING_FINANCES: NEEDS ASSISTANCE
TAKING_MEDICATION: NEEDS ASSISTANCE
DOING_HOUSEWORK: NEEDS ASSISTANCE

## 2024-11-07 NOTE — ASSESSMENT & PLAN NOTE
Orders:    hydroCHLOROthiazide (Microzide) 12.5 mg tablet; Take 1 tablet (12.5 mg) by mouth once daily.    CBC; Future    Comprehensive Metabolic Panel; Future    Lipid Panel; Future    Follow Up In [facility] - PCP - Established; Future

## 2024-11-07 NOTE — PATIENT INSTRUCTIONS
We are going to stop Amlodipine to see if helps swelling  Start hydrochlorothiazide once a day for blood pressure and swelling. This is 12.5 mg once a day.    I want Ede to do an echocardiogram to check his heart  Please send readings of blood pressure in `1 month    Ede needs to do blood test in 2 weeks  Fasting labs in February before he sees me  Ede got the flu shot    Discuss with psychiatry about overeating. He has gained 50 pounds since olanzapine added

## 2024-11-07 NOTE — PROGRESS NOTES
Subjective   Reason for Visit: Ede Zurita is an 59 y.o. male here for a Medicare Wellness visit.     Past Medical, Surgical, and Family History reviewed and updated in chart.    Reviewed all medications by prescribing practitioner or clinical pharmacist (such as prescriptions, OTCs, herbal therapies and supplements) and documented in the medical record.    HPI    Here for wellness visit  Has CPAP machine  Has not worn but has apt with RT to discuss how to use  Ede is always tired  Has some ankle swelling  Unsure if he has SOB as makes some sounds breathing    He has gained a lot of weight  Seems to have no control with eating and continues to eat  Looking back this has started after zyprexa was added (at that time before he had lost a lot of weight and now gaining    Patient Care Team:  Iza Sherman MD as PCP - General  [facility] Sherman MD [facility]  Chandrika Bedolla DO as Psychiatrist (Psychiatry)     Review of Systems   Constitutional:  Positive for fatigue. Negative for chills, fever and unexpected weight change.   HENT:  Negative for congestion, hearing loss, rhinorrhea and sore throat.    Eyes:  Negative for pain and visual disturbance.   Respiratory:  Positive for shortness of breath. Negative for cough, chest tightness and wheezing.    Cardiovascular:  Positive for leg swelling. Negative for chest pain and palpitations.   Gastrointestinal:  Negative for abdominal pain, blood in stool, constipation, diarrhea, nausea and vomiting.   Endocrine: Negative for cold intolerance, heat intolerance, polydipsia and polyphagia.   Genitourinary:  Negative for dysuria, frequency and hematuria.   Musculoskeletal:  Negative for arthralgias and myalgias.   Skin:  Negative for rash and wound.   Neurological:  Negative for dizziness, syncope and headaches.   Psychiatric/Behavioral:  Negative for dysphoric mood. The patient is not nervous/anxious.        Objective   Vitals:  /80 (BP  "Location: Left arm, Patient Position: Sitting, BP Cuff Size: Adult)   Pulse 78   Temp 36.6 °C (97.9 °F) (Temporal)   Resp 16   Ht 1.689 m (5' 6.5\")   Wt 103 kg (226 lb 9.6 oz)   SpO2 93%   BMI 36.03 kg/m²       Physical Exam  Vitals reviewed.   Constitutional:       Appearance: Normal appearance. He is not ill-appearing.   HENT:      Head: Normocephalic and atraumatic.      Right Ear: Tympanic membrane normal.      Left Ear: Tympanic membrane normal.      Nose: Nose normal.      Mouth/Throat:      Mouth: Mucous membranes are moist.      Pharynx: Oropharynx is clear.   Eyes:      Extraocular Movements: Extraocular movements intact.      Conjunctiva/sclera: Conjunctivae normal.      Pupils: Pupils are equal, round, and reactive to light.   Cardiovascular:      Rate and Rhythm: Normal rate and regular rhythm.   Pulmonary:      Effort: Pulmonary effort is normal.      Breath sounds: Normal breath sounds. No wheezing.   Abdominal:      General: There is no distension.      Palpations: Abdomen is soft. There is no mass.      Tenderness: There is no abdominal tenderness.   Musculoskeletal:      Cervical back: Neck supple.      Right lower leg: Edema (1+pitting) present.      Left lower leg: No edema (1+pitting).   Lymphadenopathy:      Cervical: No cervical adenopathy.   Neurological:      General: No focal deficit present.      Mental Status: He is alert and oriented to person, place, and time.      Gait: Gait normal.   Psychiatric:         Mood and Affect: Mood normal.         Behavior: Behavior normal.         Thought Content: Thought content normal.         Assessment & Plan  Routine general medical examination at health care facility    Orders:    1 Year Follow Up In [facility] - PCP - Wellness Exam    1 Year Follow Up In [facility] - PCP - Wellness Exam; Future    SOB (shortness of breath)    Orders:    Transthoracic Echo (TTE) Complete; Future    Edema, unspecified type    Orders:    " Basic metabolic panel; Future    Urinalysis with Reflex Microscopic; Future    Autism spectrum disorder (HHS-HCC)         Benign essential hypertension    Orders:    hydroCHLOROthiazide (Microzide) 12.5 mg tablet; Take 1 tablet (12.5 mg) by mouth once daily.    CBC; Future    Comprehensive Metabolic Panel; Future    Lipid Panel; Future    Follow Up In [facility] - PCP - Established; Future    Screening for prostate cancer    Orders:    Prostate Specific Antigen; Future    Vitamin D deficiency    Orders:    Vitamin D 25-Hydroxy,Total (for eval of Vitamin D levels); Future    Other fatigue    Orders:    TSH with reflex to Free T4 if abnormal; Future    Severe obesity (BMI 35.0-39.9) with comorbidity (Multi)         MCW done    Edema-chronic  -check echo and UA  -stop norvasc and add hydrochlorothiazide to see if helps    Severe MIRTA- seeing sleep medicine      COnstipation: on colace  -seeing GI     Unintentional weight loss: new diagnosis of CLL  -seeing heme and having workup--was advised not from CLL. 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  -had recent c-scope, CT head, CT C/A/P  -weight issues resolved  6/22-  said as needed     Lung nodule 6/24- stable     left hip pain: neg and resolved     Hypertriglyceridemia:      GERD: resolved with stopping ASA and higher PPI  -they saw [facility] had normal EGD recently  -avoid nsaids     Urinary urgency  -PSA WNL  -on flomax  -sees urology     Hypertension: controlled with norvasc and metoprolol  -stop norvasc and add hydrochlorothiazide to see if helps edema     Hyperlipidemia: on simvastatin, TG getting better      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  ENT: Dr. Rowe  Eye doc: Dr. Tomas  GI:  Dr. Saroj Sun    Labs 2 weeks. Echo, change meds. Followup 3 months     Health Maintenance  -Prostate Cancer Screenin/24 WNL  -Colonoscopy:3/15/22-repeat 5 years ([facility])  -Vaccinations: Pneumovax, tdap, shingrix. Flu today  Lives at [facility]

## 2024-11-07 NOTE — ASSESSMENT & PLAN NOTE
MCW done    Edema-chronic  -check echo and UA  -stop norvasc and add hydrochlorothiazide to see if helps

## 2024-11-08 DIAGNOSIS — E78.5 HYPERLIPIDEMIA, UNSPECIFIED HYPERLIPIDEMIA TYPE: ICD-10-CM

## 2024-11-08 DIAGNOSIS — E55.9 VITAMIN D DEFICIENCY: ICD-10-CM

## 2024-11-08 DIAGNOSIS — I10 BENIGN ESSENTIAL HYPERTENSION: ICD-10-CM

## 2024-11-08 DIAGNOSIS — K59.09 CHRONIC CONSTIPATION: ICD-10-CM

## 2024-11-08 RX ORDER — HYDROCHLOROTHIAZIDE 12.5 MG/1
12.5 TABLET ORAL DAILY
Qty: 30 TABLET | Refills: 10 | Status: SHIPPED | OUTPATIENT
Start: 2024-11-08

## 2024-11-08 RX ORDER — CHOLECALCIFEROL (VITAMIN D3) 25 MCG
TABLET ORAL DAILY
Qty: 30 TABLET | Refills: 10 | Status: SHIPPED | OUTPATIENT
Start: 2024-11-08

## 2024-11-08 RX ORDER — SIMVASTATIN 20 MG/1
20 TABLET, FILM COATED ORAL DAILY
Qty: 30 TABLET | Refills: 10 | Status: SHIPPED | OUTPATIENT
Start: 2024-11-08

## 2024-11-08 RX ORDER — DOCUSATE SODIUM 100 MG/1
CAPSULE, LIQUID FILLED ORAL
Qty: 30 CAPSULE | Refills: 10 | Status: SHIPPED | OUTPATIENT
Start: 2024-11-08

## 2024-11-17 DIAGNOSIS — C91.10 CLL (CHRONIC LYMPHOCYTIC LEUKEMIA) (MULTI): Primary | ICD-10-CM

## 2024-11-19 ENCOUNTER — APPOINTMENT (OUTPATIENT)
Dept: BEHAVIORAL HEALTH | Facility: CLINIC | Age: 59
End: 2024-11-19
Payer: COMMERCIAL

## 2024-11-19 DIAGNOSIS — F84.0 AUTISM SPECTRUM DISORDER (HHS-HCC): ICD-10-CM

## 2024-11-19 DIAGNOSIS — F79 INTELLECTUAL DISABILITY: ICD-10-CM

## 2024-11-19 DIAGNOSIS — F63.9 IMPULSE CONTROL DISORDER: ICD-10-CM

## 2024-11-19 DIAGNOSIS — Z09 PSYCHIATRIC FOLLOW-UP: ICD-10-CM

## 2024-11-19 DIAGNOSIS — G47.33 OSA (OBSTRUCTIVE SLEEP APNEA): ICD-10-CM

## 2024-11-19 DIAGNOSIS — F39 MOOD DISORDER (CMS-HCC): ICD-10-CM

## 2024-11-19 DIAGNOSIS — F51.05 INSOMNIA DUE TO OTHER MENTAL DISORDER: ICD-10-CM

## 2024-11-19 DIAGNOSIS — F29 PSYCHOSIS, UNSPECIFIED PSYCHOSIS TYPE (MULTI): ICD-10-CM

## 2024-11-19 DIAGNOSIS — F99 INSOMNIA DUE TO OTHER MENTAL DISORDER: ICD-10-CM

## 2024-11-19 DIAGNOSIS — Z86.59 HISTORY OF DYSTHYMIC DISORDER: ICD-10-CM

## 2024-11-19 DIAGNOSIS — Z86.59 PSYCHIATRIC FOLLOW-UP: ICD-10-CM

## 2024-11-19 DIAGNOSIS — F98.4 STEREOTYPIC MOVEMENT DISORDER: ICD-10-CM

## 2024-11-19 PROCEDURE — 90785 PSYTX COMPLEX INTERACTIVE: CPT | Performed by: STUDENT IN AN ORGANIZED HEALTH CARE EDUCATION/TRAINING PROGRAM

## 2024-11-19 PROCEDURE — 99214 OFFICE O/P EST MOD 30 MIN: CPT | Performed by: STUDENT IN AN ORGANIZED HEALTH CARE EDUCATION/TRAINING PROGRAM

## 2024-11-19 PROCEDURE — 90833 PSYTX W PT W E/M 30 MIN: CPT | Performed by: STUDENT IN AN ORGANIZED HEALTH CARE EDUCATION/TRAINING PROGRAM
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
## 2024-11-20 RX ORDER — OLANZAPINE 15 MG/1
TABLET ORAL
Qty: 30 TABLET | Refills: 6 | Status: CANCELLED | OUTPATIENT
Start: 2024-11-20
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
## 2024-11-21 DIAGNOSIS — K59.09 CHRONIC CONSTIPATION: Primary | ICD-10-CM

## 2024-11-21 RX ORDER — POLYETHYLENE GLYCOL 3350 17 G/17G
17 POWDER, FOR SOLUTION ORAL DAILY
Qty: 30 PACKET | Refills: 3 | Status: SHIPPED | OUTPATIENT
Start: 2024-11-21

## 2024-11-26 ENCOUNTER — LAB (OUTPATIENT)
Dept: LAB | Facility: LAB | Age: 59
End: 2024-11-26
Payer: MEDICARE

## 2024-11-26 DIAGNOSIS — R60.9 EDEMA, UNSPECIFIED TYPE: ICD-10-CM

## 2024-11-26 LAB
ANION GAP SERPL CALC-SCNC: 14 MMOL/L (ref 10–20)
BUN SERPL-MCNC: 13 MG/DL (ref 6–23)
CALCIUM SERPL-MCNC: 9.5 MG/DL (ref 8.6–10.6)
CHLORIDE SERPL-SCNC: 103 MMOL/L (ref 98–107)
CO2 SERPL-SCNC: 28 MMOL/L (ref 21–32)
CREAT SERPL-MCNC: 1.06 MG/DL (ref 0.5–1.3)
EGFRCR SERPLBLD CKD-EPI 2021: 82 ML/MIN/1.73M*2
GLUCOSE SERPL-MCNC: 118 MG/DL (ref 74–99)
POTASSIUM SERPL-SCNC: 3.5 MMOL/L (ref 3.5–5.3)
SODIUM SERPL-SCNC: 141 MMOL/L (ref 136–145)

## 2024-11-26 PROCEDURE — 80048 BASIC METABOLIC PNL TOTAL CA: CPT

## 2024-11-26 PROCEDURE — 36415 COLL VENOUS BLD VENIPUNCTURE: CPT

## 2024-12-04 ENCOUNTER — TELEPHONE (OUTPATIENT)
Dept: OTHER | Age: 59
End: 2024-12-04
Payer: COMMERCIAL

## 2024-12-06 DIAGNOSIS — Z00.00 ROUTINE GENERAL MEDICAL EXAMINATION AT HEALTH CARE FACILITY: Primary | ICD-10-CM

## 2024-12-06 RX ORDER — PEDI MV NO.227/FERROUS SULFATE 10 MG
1 TABLET,CHEWABLE ORAL DAILY
Qty: 30 TABLET | Refills: 10 | Status: SHIPPED | OUTPATIENT
Start: 2024-12-06

## 2024-12-10 PROBLEM — F63.9 IMPULSE CONTROL DISORDER: Status: ACTIVE | Noted: 2024-12-10

## 2024-12-10 PROBLEM — F39 MOOD DISORDER (CMS-HCC): Status: ACTIVE | Noted: 2024-12-10

## 2024-12-10 RX ORDER — OLANZAPINE 7.5 MG/1
TABLET ORAL
Qty: 30 TABLET | Refills: 6 | Status: SHIPPED | OUTPATIENT
Start: 2024-12-10 | End: 2025-01-08 | Stop reason: SDUPTHER

## 2024-12-10 RX ORDER — TRAZODONE HYDROCHLORIDE 100 MG/1
TABLET ORAL
Qty: 50 TABLET | Refills: 6 | Status: SHIPPED | OUTPATIENT
Start: 2024-12-10 | End: 2025-01-08 | Stop reason: SDUPTHER

## 2024-12-12 ENCOUNTER — APPOINTMENT (OUTPATIENT)
Dept: CARDIOLOGY | Facility: HOSPITAL | Age: 59
End: 2024-12-12
Payer: MEDICARE

## 2024-12-18 ENCOUNTER — APPOINTMENT (OUTPATIENT)
Dept: BEHAVIORAL HEALTH | Facility: CLINIC | Age: 59
End: 2024-12-18
Payer: MEDICARE

## 2024-12-31 ENCOUNTER — APPOINTMENT (OUTPATIENT)
Dept: CARDIOLOGY | Facility: CLINIC | Age: 59
End: 2024-12-31
Payer: COMMERCIAL
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
## 2025-01-08 ENCOUNTER — APPOINTMENT (OUTPATIENT)
Dept: BEHAVIORAL HEALTH | Facility: CLINIC | Age: 60
End: 2025-01-08
Payer: MEDICARE

## 2025-01-08 DIAGNOSIS — F84.0 AUTISM SPECTRUM DISORDER (HHS-HCC): ICD-10-CM

## 2025-01-08 DIAGNOSIS — F39 MOOD DISORDER (CMS-HCC): ICD-10-CM

## 2025-01-08 DIAGNOSIS — F79 INTELLECTUAL DISABILITY: ICD-10-CM

## 2025-01-08 DIAGNOSIS — F51.05 INSOMNIA DUE TO OTHER MENTAL DISORDER: ICD-10-CM

## 2025-01-08 DIAGNOSIS — F63.9 IMPULSE CONTROL DISORDER: ICD-10-CM

## 2025-01-08 DIAGNOSIS — Z86.59 PSYCHIATRIC FOLLOW-UP: ICD-10-CM

## 2025-01-08 DIAGNOSIS — F99 INSOMNIA DUE TO OTHER MENTAL DISORDER: ICD-10-CM

## 2025-01-08 DIAGNOSIS — Z86.59 HISTORY OF DYSTHYMIC DISORDER: ICD-10-CM

## 2025-01-08 DIAGNOSIS — F29 PSYCHOSIS, UNSPECIFIED PSYCHOSIS TYPE (MULTI): ICD-10-CM

## 2025-01-08 DIAGNOSIS — G47.33 OSA (OBSTRUCTIVE SLEEP APNEA): ICD-10-CM

## 2025-01-08 DIAGNOSIS — F98.4 STEREOTYPIC MOVEMENT DISORDER: ICD-10-CM

## 2025-01-08 DIAGNOSIS — Z09 PSYCHIATRIC FOLLOW-UP: ICD-10-CM

## 2025-01-08 DIAGNOSIS — F41.3 OTHER MIXED ANXIETY DISORDERS: ICD-10-CM

## 2025-01-08 NOTE — PATIENT INSTRUCTIONS
AFTER VISIT SUMMARY      Date: 1/8/2025  Appointment with Psychiatrist - Dr. Bedolla      Reason for Visit:  -Routine follow-up/Medication management      Discussed during Appointment:   -Physical health, psychiatric/behavioral symptoms, daily functioning, new issues/concerns     -Treatment plan/management, including medication      Clinical Impression/Status Update:  Lowering of olanzapine reportedly well tolerated. No sleep disruption, behaviors, psychosis or symtoms of tardive dyskinesia reported.   --We will continue to de-prescribe as tolerated and see him again in 6 weeks.      -Risk/Benefit assessment:   Medical necessity for continued treatment with psychotropic medication:   There is no report of signs/symptoms consistent with medication-induced impairment in daily functioning. At this time, benefits of medication felt to outweigh potential risks. But we will continue to reassess need for psychotropic medication at regular 3 to 6 month intervals, or sooner as clinically indicated.      #Clinic Policies/Procedures  -Refills  Prescriptions will be sent to your pharmacy with enough refills to last until your next appointment. For established patients, we typically provide 6 refills for regular meds and 3 refills for controlled substances (e.g., ADHD stimulant medication, benzodiazepines such as lorazepam). We will not provide additional refills beyond that without having an appointment. You can schedule an appointment by sending a Owingo message or calling our office at [phone]. aviors    -Paperwork Requests (e.g., Expert Hanny for guardianship)  We ask that you please schedule an appointment if needing paperwork filled out by the doctor (e.g., Expert Catalinaal, FMLA form).  Please provide as much information as possible about your request and email the doctor any forms needing to be filled out prior to the appointment.        ===========================  INSTRUCTIONS/RECOMMENDATIONS  ===========================    #Medication   -Medication changes made today:   --We are decreasing your olanzapine from 7.5 mg at bedtime to 5 mg at bedtime  --Take your trazodone 100 mg as needed about 40 minutes after your scheduled trazodone    >>For prior authorization issues at the pharmacy -> Call the office and ask to talk to Nurse Rangel.      If having technical Issues with Epic or [email or web address]-> Please help us improve the Epic experience  To help identify issues needing to be fixed and improve patient care, please report any issues you experience with Epic or [employer], such as difficulty connecting to video during virtual visits.  [phone]      #Follow-up  --Your next appointment is scheduled for 2/26/2025 at 9:30 am (virtual visit with [facility])    *If having new/worsening symptoms, please send a [email or web address] message or call the clinic ([phone]) to discuss being seen sooner   >>If unable to reach the office, send me an email at [email or web address]

## 2025-01-09 PROBLEM — F41.3 OTHER MIXED ANXIETY DISORDERS: Status: ACTIVE | Noted: 2019-08-09

## 2025-01-09 RX ORDER — ESCITALOPRAM OXALATE 10 MG/1
TABLET ORAL
Qty: 30 TABLET | Refills: 6 | Status: SHIPPED | OUTPATIENT
Start: 2025-01-09

## 2025-01-09 RX ORDER — OLANZAPINE 5 MG/1
TABLET ORAL
Qty: 30 TABLET | Refills: 6 | Status: SHIPPED | OUTPATIENT
Start: 2025-01-09

## 2025-01-09 RX ORDER — ESCITALOPRAM OXALATE 20 MG/1
TABLET ORAL
Qty: 30 TABLET | Refills: 6 | Status: SHIPPED | OUTPATIENT
Start: 2025-01-09

## 2025-01-09 RX ORDER — TRAZODONE HYDROCHLORIDE 100 MG/1
TABLET ORAL
Qty: 50 TABLET | Refills: 6 | Status: SHIPPED | OUTPATIENT
Start: 2025-01-09

## 2025-01-09 RX ORDER — METOPROLOL TARTRATE 50 MG/1
TABLET ORAL
Qty: 60 TABLET | Refills: 6 | Status: SHIPPED | OUTPATIENT
Start: 2025-01-09

## 2025-01-09 RX ORDER — ACETAMINOPHEN 500 MG
TABLET ORAL
Qty: 60 TABLET | Refills: 6 | Status: SHIPPED | OUTPATIENT
Start: 2025-01-09

## 2025-01-13 ENCOUNTER — OFFICE VISIT (OUTPATIENT)
Dept: HEMATOLOGY/ONCOLOGY | Facility: CLINIC | Age: 60
End: 2025-01-13
Payer: MEDICARE

## 2025-01-13 ENCOUNTER — LAB (OUTPATIENT)
Dept: LAB | Facility: CLINIC | Age: 60
End: 2025-01-13
Payer: MEDICARE

## 2025-01-13 VITALS
OXYGEN SATURATION: 96 % | RESPIRATION RATE: 16 BRPM | BODY MASS INDEX: 35.4 KG/M2 | HEART RATE: 72 BPM | TEMPERATURE: 98.8 F | SYSTOLIC BLOOD PRESSURE: 114 MMHG | DIASTOLIC BLOOD PRESSURE: 73 MMHG | WEIGHT: 222.66 LBS

## 2025-01-13 DIAGNOSIS — F84.0 AUTISM SPECTRUM DISORDER (HHS-HCC): ICD-10-CM

## 2025-01-13 DIAGNOSIS — I10 BENIGN ESSENTIAL HYPERTENSION: ICD-10-CM

## 2025-01-13 DIAGNOSIS — C91.10 CLL (CHRONIC LYMPHOCYTIC LEUKEMIA) (MULTI): Primary | ICD-10-CM

## 2025-01-13 DIAGNOSIS — N40.0 BPH WITHOUT URINARY OBSTRUCTION: ICD-10-CM

## 2025-01-13 DIAGNOSIS — E78.5 HYPERLIPIDEMIA, UNSPECIFIED HYPERLIPIDEMIA TYPE: ICD-10-CM

## 2025-01-13 DIAGNOSIS — F33.41 RECURRENT MAJOR DEPRESSIVE DISORDER, IN PARTIAL REMISSION (CMS-HCC): ICD-10-CM

## 2025-01-13 DIAGNOSIS — C91.10 CLL (CHRONIC LYMPHOCYTIC LEUKEMIA) (MULTI): ICD-10-CM

## 2025-01-13 DIAGNOSIS — K21.9 GASTROESOPHAGEAL REFLUX DISEASE, UNSPECIFIED WHETHER ESOPHAGITIS PRESENT: ICD-10-CM

## 2025-01-13 LAB
ALBUMIN SERPL BCP-MCNC: 4.6 G/DL (ref 3.4–5)
ALP SERPL-CCNC: 67 U/L (ref 33–120)
ALT SERPL W P-5'-P-CCNC: 18 U/L (ref 10–52)
ANION GAP SERPL CALC-SCNC: 11 MMOL/L (ref 10–20)
AST SERPL W P-5'-P-CCNC: 17 U/L (ref 9–39)
BASOPHILS # BLD MANUAL: 0.19 X10*3/UL (ref 0–0.1)
BASOPHILS NFR BLD MANUAL: 1 %
BILIRUB SERPL-MCNC: 0.7 MG/DL (ref 0–1.2)
BUN SERPL-MCNC: 13 MG/DL (ref 6–23)
CALCIUM SERPL-MCNC: 9.2 MG/DL (ref 8.6–10.3)
CHLORIDE SERPL-SCNC: 103 MMOL/L (ref 98–107)
CO2 SERPL-SCNC: 29 MMOL/L (ref 21–32)
CREAT SERPL-MCNC: 1.02 MG/DL (ref 0.5–1.3)
EGFRCR SERPLBLD CKD-EPI 2021: 86 ML/MIN/1.73M*2
EOSINOPHIL # BLD MANUAL: 0.19 X10*3/UL (ref 0–0.7)
EOSINOPHIL NFR BLD MANUAL: 1 %
ERYTHROCYTE [DISTWIDTH] IN BLOOD BY AUTOMATED COUNT: 14.3 % (ref 11.5–14.5)
GLUCOSE SERPL-MCNC: 126 MG/DL (ref 74–99)
HCT VFR BLD AUTO: 42 % (ref 41–52)
HGB BLD-MCNC: 14.6 G/DL (ref 13.5–17.5)
IMM GRANULOCYTES # BLD AUTO: 0.22 X10*3/UL (ref 0–0.7)
IMM GRANULOCYTES NFR BLD AUTO: 1.1 % (ref 0–0.9)
LYMPHOCYTES # BLD MANUAL: 10.37 X10*3/UL (ref 1.2–4.8)
LYMPHOCYTES NFR BLD MANUAL: 54 %
MCH RBC QN AUTO: 30.2 PG (ref 26–34)
MCHC RBC AUTO-ENTMCNC: 34.8 G/DL (ref 32–36)
MCV RBC AUTO: 87 FL (ref 80–100)
MONOCYTES # BLD MANUAL: 2.5 X10*3/UL (ref 0.1–1)
MONOCYTES NFR BLD MANUAL: 13 %
NEUTROPHILS # BLD MANUAL: 5.37 X10*3/UL (ref 1.2–7.7)
NEUTS BAND # BLD MANUAL: 0.19 X10*3/UL (ref 0–0.7)
NEUTS BAND NFR BLD MANUAL: 1 %
NEUTS SEG # BLD MANUAL: 5.18 X10*3/UL (ref 1.2–7)
NEUTS SEG NFR BLD MANUAL: 27 %
NRBC BLD-RTO: ABNORMAL /100{WBCS}
PLATELET # BLD AUTO: 216 X10*3/UL (ref 150–450)
POLYCHROMASIA BLD QL SMEAR: ABNORMAL
POTASSIUM SERPL-SCNC: 3.7 MMOL/L (ref 3.5–5.3)
PROT SERPL-MCNC: 6.8 G/DL (ref 6.4–8.2)
RBC # BLD AUTO: 4.83 X10*6/UL (ref 4.5–5.9)
RBC MORPH BLD: ABNORMAL
SODIUM SERPL-SCNC: 139 MMOL/L (ref 136–145)
TOTAL CELLS COUNTED BLD: 100
VARIANT LYMPHS # BLD MANUAL: 0.58 X10*3/UL (ref 0–0.5)
VARIANT LYMPHS NFR BLD: 3 %
WBC # BLD AUTO: 19.2 X10*3/UL (ref 4.4–11.3)

## 2025-01-13 PROCEDURE — 85007 BL SMEAR W/DIFF WBC COUNT: CPT

## 2025-01-13 PROCEDURE — G2211 COMPLEX E/M VISIT ADD ON: HCPCS | Performed by: INTERNAL MEDICINE

## 2025-01-13 PROCEDURE — 85027 COMPLETE CBC AUTOMATED: CPT

## 2025-01-13 PROCEDURE — 3074F SYST BP LT 130 MM HG: CPT | Performed by: INTERNAL MEDICINE

## 2025-01-13 PROCEDURE — 36415 COLL VENOUS BLD VENIPUNCTURE: CPT

## 2025-01-13 PROCEDURE — 3078F DIAST BP <80 MM HG: CPT | Performed by: INTERNAL MEDICINE

## 2025-01-13 PROCEDURE — 80053 COMPREHEN METABOLIC PANEL: CPT

## 2025-01-13 PROCEDURE — 99214 OFFICE O/P EST MOD 30 MIN: CPT | Performed by: INTERNAL MEDICINE

## 2025-01-13 ASSESSMENT — ENCOUNTER SYMPTOMS
HEMATOLOGIC/LYMPHATIC NEGATIVE: 1
ENDOCRINE NEGATIVE: 1
PSYCHIATRIC NEGATIVE: 1
CONSTITUTIONAL NEGATIVE: 1
GASTROINTESTINAL NEGATIVE: 1
EYES NEGATIVE: 1
RESPIRATORY NEGATIVE: 1
CARDIOVASCULAR NEGATIVE: 1
NEUROLOGICAL NEGATIVE: 1
MUSCULOSKELETAL NEGATIVE: 1

## 2025-01-13 ASSESSMENT — PAIN SCALES - GENERAL: PAINLEVEL_OUTOF10: 0-NO PAIN

## 2025-01-13 NOTE — PROGRESS NOTES
Patient ID: Ede Zurita is a 59 y.o. male.  Referring Physician: Iza Sherman MD  [address]  [facility] [place], [address]  [place] [id number]  Primary Care Provider: Iza Sherman MD  Visit Type: Follow Up      Subjective    HPI I am doing okay    Review of Systems   Constitutional: Negative.    HENT:  Negative.     Eyes: Negative.    Respiratory: Negative.     Cardiovascular: Negative.    Gastrointestinal: Negative.    Endocrine: Negative.    Genitourinary: Negative.     Musculoskeletal: Negative.    Skin: Negative.    Neurological: Negative.    Hematological: Negative.    Psychiatric/Behavioral: Negative.          Objective   BSA: 2.18 meters squared  /73 (BP Location: Right arm, Patient Position: Sitting, BP Cuff Size: Large adult)   Pulse 72   Temp 37.1 °C (98.8 °F) (Temporal)   Resp 16   Wt 101 kg (222 lb 10.6 oz)   SpO2 96%   BMI 35.40 kg/m²      has no past medical history on file.   has a past surgical history that includes Other surgical history (10/14/2020); Other surgical history (10/14/2020); Other surgical history (10/14/2020); and Other surgical history (10/14/2020).  No family history on file.  Oncology History    No history exists.       Ede Zurita  reports that he has never smoked. He has never been exposed to tobacco smoke. He has never used smokeless tobacco.  He  reports no history of alcohol use.  He  reports no history of drug use.    Physical Exam  Vitals reviewed.   Constitutional:       Appearance: Normal appearance.   HENT:      Head: Normocephalic.      Mouth/Throat:      Mouth: Mucous membranes are moist.   Eyes:      Extraocular Movements: Extraocular movements intact.      Pupils: Pupils are equal, round, and reactive to light.   Cardiovascular:      Rate and Rhythm: Normal rate and regular rhythm.      Pulses: Normal pulses.      Heart sounds: Normal heart sounds.   Pulmonary:      Effort: Pulmonary effort is normal.      Breath sounds: Normal breath sounds.    Abdominal:      General: Bowel sounds are normal.      Palpations: Abdomen is soft.   Musculoskeletal:         General: Normal range of motion.      Cervical back: Normal range of motion and neck supple.   Skin:     General: Skin is warm.   Neurological:      General: No focal deficit present.      Mental Status: He is alert and oriented to person, place, and time.   Psychiatric:         Mood and Affect: Mood normal.         Behavior: Behavior normal.         WBC   Date/Time Value Ref Range Status   01/13/2025 10:42 AM 19.2 (H) 4.4 - 11.3 x10*3/uL Final   02/19/2024 08:53 AM 15.5 (H) 4.4 - 11.3 x10*3/uL Final   02/28/2023 09:40 AM 11.1 4.4 - 11.3 x10E9/L Final   06/10/2022 09:15 AM 11.2 4.4 - 11.3 x10E9/L Final   12/10/2021 01:27 PM 9.7 4.4 - 11.3 x10E9/L Final     nRBC   Date Value Ref Range Status   01/13/2025   Final     Comment:     Not Measured   02/19/2024 0.0 0.0 - 0.0 /100 WBCs Final   12/23/2019 0.0 0.0 - 0.0 /100 WBC Final   10/15/2019 0.0 0.0 - 0.0 /100 WBC Final     RBC   Date Value Ref Range Status   01/13/2025 4.83 4.50 - 5.90 x10*6/uL Final   02/19/2024 3.57 (L) 4.50 - 5.90 x10*6/uL Final   02/28/2023 4.90 4.50 - 5.90 x10E12/L Final   06/10/2022 4.86 4.50 - 5.90 x10E12/L Final   12/10/2021 4.98 4.50 - 5.90 x10E12/L Final     Hemoglobin   Date Value Ref Range Status   01/13/2025 14.6 13.5 - 17.5 g/dL Final   02/19/2024 14.2 13.5 - 17.5 g/dL Final   02/28/2023 14.5 13.5 - 17.5 g/dL Final   06/10/2022 14.4 13.5 - 17.5 g/dL Final   12/10/2021 14.7 13.5 - 17.5 g/dL Final     Hematocrit   Date Value Ref Range Status   01/13/2025 42.0 41.0 - 52.0 % Final   02/19/2024 34.5 (L) 41.0 - 52.0 % Final   02/28/2023 42.4 41.0 - 52.0 % Final   06/10/2022 41.8 41.0 - 52.0 % Final   12/10/2021 42.7 41.0 - 52.0 % Final     MCV   Date/Time Value Ref Range Status   01/13/2025 10:42 AM 87 80 - 100 fL Final   02/19/2024 08:53 AM 97 80 - 100 fL Final   02/28/2023 09:40 AM 87 80 - 100 fL Final   06/10/2022 09:15 AM 86 80 -  "100 fL Final   12/10/2021 01:27 PM 86 80 - 100 fL Final     MCH   Date/Time Value Ref Range Status   01/13/2025 10:42 AM 30.2 26.0 - 34.0 pg Final   02/19/2024 08:53 AM 39.8 (H) 26.0 - 34.0 pg Final     MCHC   Date/Time Value Ref Range Status   01/13/2025 10:42 AM 34.8 32.0 - 36.0 g/dL Final   02/19/2024 08:53 AM 41.2 (H) 32.0 - 36.0 g/dL Final   02/28/2023 09:40 AM 34.2 32.0 - 36.0 g/dL Final   06/10/2022 09:15 AM 34.4 32.0 - 36.0 g/dL Final   12/10/2021 01:27 PM 34.4 32.0 - 36.0 g/dL Final     RDW   Date/Time Value Ref Range Status   01/13/2025 10:42 AM 14.3 11.5 - 14.5 % Final   02/19/2024 08:53 AM 21.1 (H) 11.5 - 14.5 % Final   02/28/2023 09:40 AM 13.6 11.5 - 14.5 % Final   06/10/2022 09:15 AM 13.5 11.5 - 14.5 % Final   12/10/2021 01:27 PM 13.6 11.5 - 14.5 % Final     Platelets   Date/Time Value Ref Range Status   01/13/2025 10:42  150 - 450 x10*3/uL Final   02/19/2024 08:53  150 - 450 x10*3/uL Final   02/28/2023 09:40  150 - 450 x10E9/L Final   06/10/2022 09:15  150 - 450 x10E9/L Final   12/10/2021 01:27  150 - 450 x10E9/L Final     No results found for: \"MPV\"  Neutrophils %   Date/Time Value Ref Range Status   02/28/2023 09:40 AM 38.5 40.0 - 80.0 % Final   06/10/2022 09:15 AM 54.3 40.0 - 80.0 % Final   12/10/2021 01:27 PM 49.7 40.0 - 80.0 % Final     Immature Granulocytes %, Automated   Date/Time Value Ref Range Status   01/13/2025 10:42 AM 1.1 (H) 0.0 - 0.9 % Final     Comment:     Immature Granulocyte Count (IG) includes promyelocytes, myelocytes and metamyelocytes but does not include bands. Percent differential counts (%) should be interpreted in the context of the absolute cell counts (cells/UL).   02/19/2024 08:53 AM 1.6 (H) 0.0 - 0.9 % Final     Comment:     Immature Granulocyte Count (IG) includes promyelocytes, myelocytes and metamyelocytes but does not include bands. Percent differential counts (%) should be interpreted in the context of the absolute cell counts " (cells/UL).   02/28/2023 09:40 AM 0.9 0.0 - 0.9 % Final     Comment:      Immature Granulocyte Count (IG) includes promyelocytes,    myelocytes and metamyelocytes but does not include bands.   Percent differential counts (%) should be interpreted in the   context of the absolute cell counts (cells/L).     06/10/2022 09:15 AM 0.2 0.0 - 0.9 % Final     Comment:      Immature Granulocyte Count (IG) includes promyelocytes,    myelocytes and metamyelocytes but does not include bands.   Percent differential counts (%) should be interpreted in the   context of the absolute cell counts (cells/L).     12/10/2021 01:27 PM 0.3 0.0 - 0.9 % Final     Comment:      Immature Granulocyte Count (IG) includes promyelocytes,    myelocytes and metamyelocytes but does not include bands.   Percent differential counts (%) should be interpreted in the   context of the absolute cell counts (cells/L).       Lymphocytes %, Manual   Date/Time Value Ref Range Status   01/13/2025 10:42 AM 54.0 13.0 - 44.0 % Final   02/19/2024 08:53 AM 51.0 13.0 - 44.0 % Final     Lymphocytes %   Date/Time Value Ref Range Status   02/28/2023 09:40 AM 45.6 13.0 - 44.0 % Final   06/10/2022 09:15 AM 34.7 13.0 - 44.0 % Final   12/10/2021 01:27 PM 40.8 13.0 - 44.0 % Final   06/09/2021 08:25 AM 35.0 13.0 - 44.0 % Final     Monocytes %, Manual   Date/Time Value Ref Range Status   01/13/2025 10:42 AM 13.0 2.0 - 10.0 % Final   02/19/2024 08:53 AM 6.0 2.0 - 10.0 % Final     Monocytes %   Date/Time Value Ref Range Status   02/28/2023 09:40 AM 8.5 2.0 - 10.0 % Final   06/10/2022 09:15 AM 8.3 2.0 - 10.0 % Final   12/10/2021 01:27 PM 7.9 2.0 - 10.0 % Final   06/09/2021 08:25 AM 6.0 2.0 - 10.0 % Final     Eosinophils %, Manual   Date/Time Value Ref Range Status   01/13/2025 10:42 AM 1.0 0.0 - 6.0 % Final   02/19/2024 08:53 AM 4.0 0.0 - 6.0 % Final     Eosinophils %   Date/Time Value Ref Range Status   02/28/2023 09:40 AM 5.6 0.0 - 6.0 % Final   06/10/2022 09:15 AM 2.1 0.0 - 6.0  % Final   12/10/2021 01:27 PM 0.8 0.0 - 6.0 % Final   06/09/2021 08:25 AM 3.0 0.0 - 6.0 % Final     Basophils %, Manual   Date/Time Value Ref Range Status   01/13/2025 10:42 AM 1.0 0.0 - 2.0 % Final   02/19/2024 08:53 AM 1.0 0.0 - 2.0 % Final     Basophils %   Date/Time Value Ref Range Status   02/28/2023 09:40 AM 0.9 0.0 - 2.0 % Final   06/10/2022 09:15 AM 0.4 0.0 - 2.0 % Final   12/10/2021 01:27 PM 0.5 0.0 - 2.0 % Final   06/09/2021 08:25 AM 0.0 0.0 - 2.0 % Final     Neutrophils Absolute   Date/Time Value Ref Range Status   02/28/2023 09:40 AM 4.28 1.20 - 7.70 x10E9/L Final   06/10/2022 09:15 AM 6.05 1.20 - 7.70 x10E9/L Final   12/10/2021 01:27 PM 4.80 1.20 - 7.70 x10E9/L Final     Immature Granulocytes Absolute, Automated   Date/Time Value Ref Range Status   01/13/2025 10:42 AM 0.22 0.00 - 0.70 x10*3/uL Final   02/19/2024 08:53 AM 0.25 0.00 - 0.70 x10*3/uL Final     Lymphocytes Absolute   Date/Time Value Ref Range Status   02/28/2023 09:40 AM 5.06 (H) 1.20 - 4.80 x10E9/L Final   06/10/2022 09:15 AM 3.87 1.20 - 4.80 x10E9/L Final   12/10/2021 01:27 PM 3.95 1.20 - 4.80 x10E9/L Final     Monocytes Absolute   Date/Time Value Ref Range Status   02/28/2023 09:40 AM 0.94 0.10 - 1.00 x10E9/L Final   06/10/2022 09:15 AM 0.93 0.10 - 1.00 x10E9/L Final   12/10/2021 01:27 PM 0.76 0.10 - 1.00 x10E9/L Final     Eosinophils Absolute   Date/Time Value Ref Range Status   02/28/2023 09:40 AM 0.62 0.00 - 0.70 x10E9/L Final   06/10/2022 09:15 AM 0.23 0.00 - 0.70 x10E9/L Final   12/10/2021 01:27 PM 0.08 0.00 - 0.70 x10E9/L Final   06/09/2021 08:25 AM 0.37 0.00 - 0.70 x10E9/L Final     Eosinophils Absolute, Manual   Date/Time Value Ref Range Status   01/13/2025 10:42 AM 0.19 0.00 - 0.70 x10*3/uL Final   02/19/2024 08:53 AM 0.62 0.00 - 0.70 x10*3/uL Final     Basophils Absolute   Date/Time Value Ref Range Status   02/28/2023 09:40 AM 0.10 0.00 - 0.10 x10E9/L Final   06/10/2022 09:15 AM 0.05 0.00 - 0.10 x10E9/L Final   12/10/2021 01:27  "PM 0.05 0.00 - 0.10 x10E9/L Final     Comment:     Automated WBC differential has been confirmed by manual smear.   06/09/2021 08:25 AM 0.00 0.00 - 0.10 x10E9/L Final     Basophils Absolute, Manual   Date/Time Value Ref Range Status   01/13/2025 10:42 AM 0.19 (H) 0.00 - 0.10 x10*3/uL Final   02/19/2024 08:53 AM 0.16 (H) 0.00 - 0.10 x10*3/uL Final       No components found for: \"PT\"  No results found for: \"APTT\"  Medication Documentation Review Audit       Reviewed by Tiana Ortiz MA (Medical Assistant) on 01/13/25 at 1107      Medication Order Taking? Sig Documenting Provider Last Dose Status   acetaminophen (Tylenol) 325 mg tablet [id number] Yes Take 2 tablets (650 mg) by mouth every 6 hours if needed for mild pain (1 - 3), moderate pain (4 - 6), fever (temp greater than 38.0 C) or headaches. Historical Provider, MD Taking Active   ammonium lactate (Amlactin) 12 % cream [id number] Yes  Historical Provider, MD  Active   diaper,brief,adult,disposable (Depend Real Fit Brief Men L/XL) misc [id number] Yes 1 each if needed (incontinence). Teodoro Min MD Taking Active   docusate sodium (Colace) 100 mg capsule [id number] Yes TAKE 1 CAP BY MOUTH ONCE DAILY - HOLD IF DIARRHEA OCCURS Iza Sherman MD  Active   dorzolamide-timoloL (Cosopt) 22.3-6.8 mg/mL ophthalmic solution [id number] Yes Administer 1 drop into both eyes. Historical Provider, MD Taking Active   erythromycin (Romycin) 5 mg/gram (0.5 %) ophthalmic ointment [id number] Yes Apply to both eyes. Historical Provider, MD Taking Active     Discontinued 01/09/25 1429   escitalopram (Lexapro) 10 mg tablet [id number] Yes Take 1 tablet (10mg) scheduled once daily at bedtime for mood [F39.0] *Take in addition to Escitalopram 20mg tablet for total daily dose of 30mg. Chandrika Bedolla DO  Active     Discontinued 01/09/25 1429   escitalopram (Lexapro) 20 mg tablet [id number] Yes Take 1 tablet (20mg) scheduled once daily at bedtime for mood [F39.0] *Take in addition to " Escitalopram 10mg tablet for total daily dose of 30mg. Chandrika Bedolla DO  Active   Flintstones Complete, fe sulf, 10 mg iron tablet,chewable [id number] Yes CHEW AND SWALLOW 1 TABLET BY MOUTH ONCE DAILY Iza Sherman MD  Active   hydroCHLOROthiazide (Microzide) 12.5 mg tablet [id number] Yes TAKE 1 TAB BY MOUTH ONCE DAILY Iza Sherman MD  Active   ketoconazole (NIZOral) 2 % cream [id number] Yes  Historical Provider, MD  Active   latanoprost (Xalatan) 0.005 % ophthalmic solution [id number] Yes 1 drop once daily at bedtime. Historical Provider, MD  Active     Discontinued 01/09/25 1429   melatonin 5 mg tablet [id number] Yes Take 2 tablets (10mg) scheduled once daily at bedtime for sleep [F51.05] Chandrika Bedolla DO  Active     Discontinued 01/09/25 1429   metoprolol tartrate (Lopressor) 50 mg tablet [id number] Yes Take scheduled twice daily: 1 tablet (50mg) in the morning (7:00) and 1 tablet (50mg) in the evening (17:00) for anxiety/impulse control [F41.3/F63.9]  *Take blood pressure and pulse daily before administering medication. Hold and notify nursing if blood pressure less than 90/60 or pulse less than 60. Chandrika Bedolla DO  Active   OLANZapine (ZyPREXA) 5 mg tablet [id number] Yes Take 1 tablet (5mg) scheduled once daily at bedtime for psychosis/impulse control [F29/F63.9] Chandrika Bedolla DO  Active     Discontinued 01/09/25 1429   omeprazole (PriLOSEC) 40 mg DR capsule [id number] Yes Take 1 capsule (40 mg) by mouth once daily. Historical Provider, MD Taking Active   pediatric multivitamin (Children's Multivitamin) tablet,chewable chewable tablet [id number] Yes Chew 1 tablet once daily. CHEW AND SWALLOW Iza Sherman MD Taking Active   pimecrolimus (Elidel) 1 % cream [id number] Yes Use twice a day around mouth and nose to rash. Once gone, can stop. Iza Sherman MD Taking Active   polyethylene glycol (Glycolax, Miralax) 17 gram packet [id number] Yes Take 17 g by mouth once daily. Iza Sherman MD   "Active   simvastatin (Zocor) 20 mg tablet [id number] Yes TAKE 1 TAB BY MOUTH ONCE DAILY Iza Sherman MD  Active   tamsulosin (Flomax) 0.4 mg 24 hr capsule [id number] Yes Take 1 capsule (0.4 mg) by mouth once daily at bedtime. Teodoro Min MD Taking Active     Discontinued 01/09/25 1429   traZODone (Desyrel) 100 mg tablet [id number] Yes Take 1 tablet (100mg) scheduled once daily at bedtime for sleep [F51.05] *Also for PRN use: Can take 1 additional tablet (100mg), up to once daily, as needed for PRN difficulty falling asleep if still awake >45 min. Chandrika Bedolla,   Active   Vitamin D3 25 mcg (1,000 unit) tablet [id number] Yes TAKE 1 TAB BY MOUTH ONCE DAILY Iza Sherman MD  Active                   Assessment/Plan    1) MBCL/CLL  -has had persistent leukocytosis/lymphocytosis since 2019  -original flow cytometry  done on 6/24/2021 showed he had MBCL (originally misinterpreted by our BERE as CLL): an abnormal CD19+, CD20 dim to moderate, CD5+, CD23+, CD10=, dim to negative kappa restricted B cell population identified (16.2% of total events); a second abnormal CD5-, CD10- dim lambda B cell population also noted (4.5% of total events); the immunophenotype of the CD5+ abnormal B cell population is characteristic of CLL, however, the frequency of the clonal B cells is less than 5000 cells per mL; population may represent a monoclonal B cell lymphocytosis  -6/24/2021 wbc 13.8, hgb 15.7, plt 280,000, ANC  6320, abs lymph 5710  -8/23/2021 wbc 15.8, hgb 15.2 plt 322,000, ANC 7920, abs lymph 6070  -12/10/2021 wbc 9.7, hgb 14.7, plt 251,000, ANC 4800, abs lymph 3950  -6/10/2022 wbc 11.2, hgb 14.4, plt 250,000, ANC 6050, abs lymph 3870  -2/28/2023 wbc 11.1, hgb 14.5, plt 201,000, ANC 4280, abs lymph 5060  -2/19/2024 wbc 15.5, hgb 14.2, plt 214,000, ANC 5740, abs lymph 7910  -caretaker reports that Ede has not lost any weight, has no constitutional symptoms  -his absolute lymphocyte count has trended up into the \"CLL " "range\", however, both MBCL and CLL are just monitored  -today we checked CBC + COMP  -results reviewed--wbc 19.2, hgb 14.6, plt 216,000, ANC 5180, abs lymph 10,370, creatinine 1.02, calcium 9.2, albumin 4.6, alk phos 67, AST 17, total bili 0.7, ALT 18  -limited physical exam was done today--no cervical, supraclavicular, axillary adenopathy  -will continue to see him annually    2) autism spectrum disorder  -resides at [facility]    3) dysthymic disorder  -on olanzapine  -on trazodone  -on lexapro    4) hyperlipidemia  -on simvastatin    5) hypertension  -on metoprolol  -on hydrochlorothiazide    6) GERD  -on omeprazole    7) BPH   -on tamsulosin     Problem List Items Addressed This Visit             ICD-10-CM    CLL (chronic lymphocytic leukemia) (Multi) C91.10    Relevant Orders    CBC and Auto Differential (Completed)    Comprehensive Metabolic Panel (Completed)            Estrada Sharma MD                         "

## 2025-01-23 ENCOUNTER — APPOINTMENT (OUTPATIENT)
Facility: CLINIC | Age: 60
End: 2025-01-23
Payer: MEDICARE

## 2025-02-03 NOTE — PROGRESS NOTES
Patient: Ede Zurita  : 1965 AGE: 59 y.o. SEX:male   MRN: [id number]   Provider: JOAQUIN VARGAS MA     Location [facility]   Service Date: 2/3/2025     PCP: Iza Sherman MD   Referred by: No ref. provider found          [facility]  Follow Up Visit Note      HISTORY OF PRESENT ILLNESS     Ede Zurita is a 59 y.o. male with a h/o  MIRTA, hypertension, hyperlipidemia, TMJ, GERD, BPH, anxiety, depression, stereotypic movement disorder, intellectual disability, autism spectrum disorder, eczema, obesity  who presents to [facility].    25:      10/24/2024: NPV with concerns of MIRTA management. Patient presents with Estiven (medical care coordinator at [facility] where he has resided for 20+ years). Most of history obtained from Estiven who reports he always snores very loud and is tired throughout the day. ---> start APAP 5-20 CWP      SLEEP STUDY HISTORY (personally reviewed raw data such as interpretation report, data sheet, hypnogram, and titration table if available and applicable)  -HSAT 2024-showing severe MIRTA with AHI 3% 56.5, AHI 4% 44.8, SpO2 zeinab 68.7%.  SpO2 <= 88% for 52.3 minutes    SLEEP-WAKE SCHEDULE    Sleep Patterns: He does not have a usual bed partner. In terms of the patient's sleep/wake cycle, he generally gets into bed at approximately 8 PM.  his latency to sleep onset after lights out is quick. During the night, the patient generally awakens 1 times nightly. These awakenings are usually brief in duration. Final wake time on weekday mornings is around 6 AM.    Compared to weekdays, the patient's sleep schedule is  similar on the weekends.    Breathing during sleep: snoring and witnessed apneas  Behaviors at night: No   Sleep paralysis: No   Hypnogogic or hypnopompic hallucinations: No   Cataplexy: No     Leg symptoms and timing:  - Sensations: Patient does not have unusual sensations in their extremities  that cause an urge to move them   - Movement: Patient has not been told that their legs kick or jerk during sleep    Daytime Symptoms:  On awakening patient reports: wake unrefreshed, feels sleepy, and hard to get out of bed  Patient report some daytime symptoms including: DAYTIME SYMPTOMS: reports sleep inertia    Sleep environment:  Preferred sleep position: back  Room is dark: Yes  Room is quiet: Yes  Room is cool: Yes  Bed comfort: good    SLEEP HABITS  Caffeine consumption: Yes, rarely (occasional)  Alcohol consumption: No  Smoking: No  Marijuana: No  Sleep aids: melatonin     WEIGHT: stable    ESS:      REVIEW OF SYSTEMS     All other systems have been reviewed and are negative.    ALLERGIES     No Known Allergies    MEDICATIONS     Current Outpatient Medications   Medication Sig Dispense Refill    acetaminophen (Tylenol) 325 mg tablet Take 2 tablets (650 mg) by mouth every 6 hours if needed for mild pain (1 - 3), moderate pain (4 - 6), fever (temp greater than 38.0 C) or headaches.      ammonium lactate (Amlactin) 12 % cream       diaper,brief,adult,disposable (Depend Real Fit Brief Men L/XL) misc 1 each if needed (incontinence). 96 each 11    docusate sodium (Colace) 100 mg capsule TAKE 1 CAP BY MOUTH ONCE DAILY - HOLD IF DIARRHEA OCCURS 30 capsule 10    dorzolamide-timoloL (Cosopt) 22.3-6.8 mg/mL ophthalmic solution Administer 1 drop into both eyes.      erythromycin (Romycin) 5 mg/gram (0.5 %) ophthalmic ointment Apply to both eyes.      escitalopram (Lexapro) 10 mg tablet Take 1 tablet (10mg) scheduled once daily at bedtime for mood [F39.0] *Take in addition to Escitalopram 20mg tablet for total daily dose of 30mg. 30 tablet 6    escitalopram (Lexapro) 20 mg tablet Take 1 tablet (20mg) scheduled once daily at bedtime for mood [F39.0] *Take in addition to Escitalopram 10mg tablet for total daily dose of 30mg. 30 tablet 6    Flintstones Complete, fe sulf, 10 mg iron tablet,chewable CHEW AND SWALLOW 1 TABLET  BY MOUTH ONCE DAILY 30 tablet 10    hydroCHLOROthiazide (Microzide) 12.5 mg tablet TAKE 1 TAB BY MOUTH ONCE DAILY 30 tablet 10    ketoconazole (NIZOral) 2 % cream       latanoprost (Xalatan) 0.005 % ophthalmic solution 1 drop once daily at bedtime.      melatonin 5 mg tablet Take 2 tablets (10mg) scheduled once daily at bedtime for sleep [F51.05] 60 tablet 6    metoprolol tartrate (Lopressor) 50 mg tablet Take scheduled twice daily: 1 tablet (50mg) in the morning (7:00) and 1 tablet (50mg) in the evening (17:00) for anxiety/impulse control [F41.3/F63.9]  *Take blood pressure and pulse daily before administering medication. Hold and notify nursing if blood pressure less than 90/60 or pulse less than 60. 60 tablet 6    OLANZapine (ZyPREXA) 5 mg tablet Take 1 tablet (5mg) scheduled once daily at bedtime for psychosis/impulse control [F29/F63.9] 30 tablet 6    omeprazole (PriLOSEC) 40 mg DR capsule Take 1 capsule (40 mg) by mouth once daily.      pediatric multivitamin (Children's Multivitamin) tablet,chewable chewable tablet Chew 1 tablet once daily. CHEW AND SWALLOW 30 each 10    pimecrolimus (Elidel) 1 % cream Use twice a day around mouth and nose to rash. Once gone, can stop. 60 g 1    polyethylene glycol (Glycolax, Miralax) 17 gram packet Take 17 g by mouth once daily. 30 packet 3    simvastatin (Zocor) 20 mg tablet TAKE 1 TAB BY MOUTH ONCE DAILY 30 tablet 10    tamsulosin (Flomax) 0.4 mg 24 hr capsule Take 1 capsule (0.4 mg) by mouth once daily at bedtime. 90 capsule 3    traZODone (Desyrel) 100 mg tablet Take 1 tablet (100mg) scheduled once daily at bedtime for sleep [F51.05] *Also for PRN use: Can take 1 additional tablet (100mg), up to once daily, as needed for PRN difficulty falling asleep if still awake >45 min. 50 tablet 6    Vitamin D3 25 mcg (1,000 unit) tablet TAKE 1 TAB BY MOUTH ONCE DAILY 30 tablet 10     No current facility-administered medications for this visit.       PAST HISTORIES     PERTINENT  "PAST MEDICAL HISTORY: See HPI    PERTINENT PAST SURGICAL HISTORY for Sleep Medicine:  non-contributory    PERTINENT FAMILY HISTORY for Sleep Medicine:  Unknown    PERTINENT SOCIAL HISTORY:  He  reports that he has never smoked. He has never been exposed to tobacco smoke. He has never used smokeless tobacco. He reports that he does not drink alcohol and does not use drugs. He currently lives at [facility].    Active Problems, Allergy List, Medication List, and PMH/PSH/FH/Social Hx have been reviewed and reconciled in chart. No significant changes unless documented in the pertinent chart section. Updates made when necessary.     PHYSICAL EXAM     VITAL SIGNS: There were no vitals taken for this visit.    CURRENT WEIGHT:   There were no vitals filed for this visit.     PREVIOUS WEIGHTS:  Wt Readings from Last 3 Encounters:   01/13/25 101 kg (222 lb 10.6 oz)   11/07/24 103 kg (226 lb 9.6 oz)   10/24/24 103 kg (226 lb 6.4 oz)     Physical Exam  Constitutional: Awake, not in distress  Lungs: Clear to auscultation bilateral, no cough noted  Heart: Regular rate and rhythm  Skin: Warm, no rash  Neuro: No tremors, moves all extremities  Psych: alert and oriented to time, place, and person    HEENT:   Tonsils enlargement grade 2+   Airway comments: narrow lateral walls   Large tongue    Tongue scalloping: yes   Modified Mallampati score - 3    RESULTS/DATA     No results found for: \"IRON\", \"TRANSFERRIN\", \"IRONSAT\", \"TIBC\", \"FERRITIN\"    Bicarbonate   Date Value Ref Range Status   01/13/2025 29 21 - 32 mmol/L Final       ASSESSMENT/PLAN     Mr. Zurita is a 59 y.o. male and He was referred to the [facility] for evaluation of MIRTA    Problem List, Orders, Assessment, Recommendations:    #MIRTA, severe  -HSAT 5/16/2024-showing severe MIRTA with AHI 3% 56.5, AHI 4% 44.8, SpO2 zeinab 68.7%.  SpO2 <= 88% for 52.3 minutes  - discussed initiating APAP vs. Obtaining dedicated PAP titration study- would like " to start with APAP first  - will start APAP 5-20 cwp via DME- [facility] (PLEASE EXPEDITE DUE TO MIRTA SEVERITY)   - would consider PAP titration or overnight pulse ox at follow up visit   -Sleep apnea, PAP therapy education as well as the tips to be successful with PAP treatment was provided at length in clinic today. Patient verbalized understanding.  - Discussed 30-day mask guarantee and insurance requirement regarding PAP compliance and follow-up.   -Diet, exercise, and weight loss were emphasized today in clinic, as were non-supine sleep, avoiding alcohol in the late evening, and driving or operating heavy machinery when sleepy.   - patient will follow-up in 2-3 months and bring equipment to the follow-up clinic        #DEPRESSION / ANXIETY/AUTISM  - denies any SI, HI or hallucinations   - currently on meds, well controlled  - defer management to PCP / Psychiatry     #HTN  BP Readings from Last 1 Encounters:   01/13/25 114/73     - doing well, asymptomatic, denies any headache, blurry vision, chest pain, palpitation, dizziness, lightheadedness, or syncopal episodes  - discussed at length the impact of untreated MIRTA and BP control  - supportive management: low salt DASH diet (less than 2000 mg sodium intake daily), moderate intensity aerobic exercise at least 30 minutes 5 days per week, reduce stress, quit smoking, limit alcohol, lose weight, and monitor BP once daily  - continue current management and follow-up with PCP     #Obesity  BMI Readings from Last 1 Encounters:   01/13/25 35.40 kg/m²     - Encouraged healthy weight loss via diet and exercise  - Weight loss can help in the long term treatment of MIRTA.  - Defer management to PCP     All of patient's questions were answered. He verbalizes understanding and agreement with my assessment and plan.    Disposition    Return to clinic in 3 months    I personally spent 45 minutes today (exclusive of procedures) providing care for this patient, including preparation, face  to face time, EMR documentation and other services such as review of medical records, diagnostic results, patient education, counseling, and coordination of care.

## 2025-02-06 DIAGNOSIS — N40.0 BPH WITHOUT URINARY OBSTRUCTION: ICD-10-CM

## 2025-02-06 RX ORDER — TAMSULOSIN HYDROCHLORIDE 0.4 MG/1
0.4 CAPSULE ORAL NIGHTLY
Qty: 30 CAPSULE | Refills: 3 | Status: SHIPPED | OUTPATIENT
Start: 2025-02-06

## 2025-02-06 NOTE — TELEPHONE ENCOUNTER
Patient needs a refill on Flomax, originally prescribed prn from urologist Teodoro Truong but they are having diff getting refill, just a one time refill would be great as patient is out of medication. Pharmacy: [facility] in [place].

## 2025-02-10 ENCOUNTER — APPOINTMENT (OUTPATIENT)
Dept: CARDIOLOGY | Facility: CLINIC | Age: 60
End: 2025-02-10
Payer: COMMERCIAL

## 2025-02-10 DIAGNOSIS — R06.02 SOB (SHORTNESS OF BREATH): ICD-10-CM

## 2025-02-10 LAB
AORTIC VALVE MEAN GRADIENT: 3 MMHG
AORTIC VALVE PEAK VELOCITY: 1.22 M/S
AV PEAK GRADIENT: 6 MMHG
AVA (PEAK VEL): 3.38 CM2
AVA (VTI): 3.63 CM2
EJECTION FRACTION: 63 %
LEFT ATRIUM VOLUME AREA LENGTH INDEX BSA: 19.5 ML/M2
LEFT VENTRICLE INTERNAL DIMENSION DIASTOLE: 4.32 CM (ref 3.5–6)
LEFT VENTRICULAR OUTFLOW TRACT DIAMETER: 2.24 CM
MITRAL VALVE E/A RATIO: 0.65
RIGHT VENTRICLE FREE WALL PEAK S': 15 CM/S
TRICUSPID ANNULAR PLANE SYSTOLIC EXCURSION: 2.2 CM

## 2025-02-10 PROCEDURE — 93306 TTE W/DOPPLER COMPLETE: CPT | Performed by: INTERNAL MEDICINE

## 2025-02-10 PROCEDURE — 93306 TTE W/DOPPLER COMPLETE: CPT

## 2025-02-11 ENCOUNTER — APPOINTMENT (OUTPATIENT)
Facility: CLINIC | Age: 60
End: 2025-02-11
Payer: COMMERCIAL

## 2025-02-11 VITALS
RESPIRATION RATE: 18 BRPM | WEIGHT: 220.2 LBS | SYSTOLIC BLOOD PRESSURE: 121 MMHG | DIASTOLIC BLOOD PRESSURE: 79 MMHG | HEART RATE: 83 BPM | OXYGEN SATURATION: 96 % | BODY MASS INDEX: 35.01 KG/M2

## 2025-02-11 DIAGNOSIS — G47.33 OSA (OBSTRUCTIVE SLEEP APNEA): Primary | ICD-10-CM

## 2025-02-11 DIAGNOSIS — E66.9 OBESITY (BMI 30-39.9): ICD-10-CM

## 2025-02-11 DIAGNOSIS — I10 BENIGN ESSENTIAL HYPERTENSION: ICD-10-CM

## 2025-02-11 DIAGNOSIS — Z78.9 NONSMOKER: ICD-10-CM

## 2025-02-11 DIAGNOSIS — F84.0 AUTISM SPECTRUM DISORDER (HHS-HCC): ICD-10-CM

## 2025-02-11 PROCEDURE — 99214 OFFICE O/P EST MOD 30 MIN: CPT | Performed by: NURSE PRACTITIONER

## 2025-02-11 PROCEDURE — G2211 COMPLEX E/M VISIT ADD ON: HCPCS | Performed by: NURSE PRACTITIONER

## 2025-02-11 PROCEDURE — 3078F DIAST BP <80 MM HG: CPT | Performed by: NURSE PRACTITIONER

## 2025-02-11 PROCEDURE — 1036F TOBACCO NON-USER: CPT | Performed by: NURSE PRACTITIONER

## 2025-02-11 PROCEDURE — 3074F SYST BP LT 130 MM HG: CPT | Performed by: NURSE PRACTITIONER

## 2025-02-11 ASSESSMENT — ENCOUNTER SYMPTOMS
DEPRESSION: 0
LOSS OF SENSATION IN FEET: 0
OCCASIONAL FEELINGS OF UNSTEADINESS: 0

## 2025-02-11 ASSESSMENT — SLEEP AND FATIGUE QUESTIONNAIRES
HOW LIKELY ARE YOU TO NOD OFF OR FALL ASLEEP WHILE SITTING QUIETLY AFTER LUNCH WITHOUT ALCOHOL: WOULD NEVER DOZE
HOW LIKELY ARE YOU TO NOD OFF OR FALL ASLEEP WHEN YOU ARE A PASSENGER IN A CAR FOR AN HOUR WITHOUT A BREAK: MODERATE CHANCE OF DOZING
SITING INACTIVE IN A PUBLIC PLACE LIKE A CLASS ROOM OR A MOVIE THEATER: SLIGHT CHANCE OF DOZING
HOW LIKELY ARE YOU TO NOD OFF OR FALL ASLEEP WHILE SITTING AND READING: MODERATE CHANCE OF DOZING
HOW LIKELY ARE YOU TO NOD OFF OR FALL ASLEEP WHILE LYING DOWN TO REST IN THE AFTERNOON WHEN CIRCUMSTANCES PERMIT: MODERATE CHANCE OF DOZING
HOW LIKELY ARE YOU TO NOD OFF OR FALL ASLEEP WHILE WATCHING TV: MODERATE CHANCE OF DOZING
HOW LIKELY ARE YOU TO NOD OFF OR FALL ASLEEP WHILE SITTING AND TALKING TO SOMEONE: WOULD NEVER DOZE
HOW LIKELY ARE YOU TO NOD OFF OR FALL ASLEEP IN A CAR, WHILE STOPPED FOR A FEW MINUTES IN TRAFFIC: SLIGHT CHANCE OF DOZING
ESS-CHAD TOTAL SCORE: 10

## 2025-02-11 ASSESSMENT — COLUMBIA-SUICIDE SEVERITY RATING SCALE - C-SSRS
2. HAVE YOU ACTUALLY HAD ANY THOUGHTS OF KILLING YOURSELF?: NO
6. HAVE YOU EVER DONE ANYTHING, STARTED TO DO ANYTHING, OR PREPARED TO DO ANYTHING TO END YOUR LIFE?: NO

## 2025-02-11 NOTE — PATIENT INSTRUCTIONS
[facility]  DO [address] [place] [address] [place] [address]       NAME: Eed Zurita   DATE: 02/11/25    Your Sleep Provider Today: BENNETT Watson  Your Primary Care Physician: Iza Sherman MD       DIAGNOSIS:   1. MIRTA (obstructive sleep apnea)        2. Benign essential hypertension        3. Autism spectrum disorder ([facility])        4. Obesity (BMI 30-39.9)        5. Nonsmoker            Thank you for coming to the Sleep Medicine Clinic today! Your sleep medicine provider today was: BENNETT Watson Below is a summary of your treatment plan, other important information, and our contact numbers:      TREATMENT PLAN     - Follow-up in 4 months.  - If not already done, sign up for 'My Chart' and send prescription requests or messages through this  - Continue CPAP.     Scheduling a Sleep Study    Your provider ordered a sleep apnea test today. It will usually take 2 - 3 business days for Insurance to approve the order.     Once you test is approved it will be sent to the ordering sleep lab. When the sleep lab is notified of the new order, they will reach out to you to get you scheduled for an in-lab sleep study or to get you scheduled for a pick-up date for your Home Sleep Study Kit (depending on which type of study your provider recommended).    They usually will reach out to you about 1 week after your test has been ordered. Please contact the office at [phone] if you have not heard back from them in 2 weeks after you have seen your provider. If your sleep study was ordered through  [employer] (mail away kit) you can call them at [phone] if you do not hear from them within 2 weeks.     Sleep Testing for sleep apnea: The best and ideal way to check out if you have sleep apnea is to do an overnight sleep study in the sleep laboratory. Alternatively, a home sleep apnea test can also be done depending on your  insurance and risk factors.     If you are having a home sleep apnea test, kindly allot 1 hour during pickup of the testing kit as you will have to complete paperwork and listen to the sleep technician for in-person on-the-spot demonstration and instructions on how to hook up the testing kit at home. Do the test for 1 day and start off with sleeping on your back. If you sleep on your side in the middle of night or you have always been a side or stomach-sleeper, it is ok as long as you have some time on your back and off-back.     If you are having an overnight in-lab sleep study, please make sure to bring toiletries, a comfy pillow, additional warm blankets, and any nighttime medications (include as-needed inhaler, pain pill, etc) that you may regularly take. Also, be sure to eat dinner before you arrive as we generally do not provide meals inside the sleep testing center. Lastly, in order to fall asleep faster in the sleep testing center, we advise patients to wake up 2 hours earlier on the morning of scheduled testing and avoid napping 2 days prior testing. Sometimes, your sleep provider may prescribe a sleep aid to be taken at lights out in the sleep testing center. If you are taking a sleep aid, consider having somebody pick you up after the sleep testing.    Overnight sleep studies may be scheduled on a weekday or weekend. We also perform daytime testing for shift workers on a case-by-case basis.    Once you have booked an appointment to do the sleep study, please contact my office for follow-up visit to discuss results.       IMPORTANT INFORMATION     Call 911 for medical emergencies.  Our offices are generally open from Monday-Friday, 9 am - 5 pm.  If you need to get in touch with me, you may either call me/my team (number is below) or you can use [employer].  If a referral for a test, for CPAP, or for another specialist was made, and you have not heard about scheduling this within a week, please call scheduling  "at [phone] ([phone]).  If you are unable to make your appointment for clinic or an overnight study, kindly call the office at least 48 hours in advance to cancel and reschedule.  If you are on CPAP, please bring your device's card or the device to each clinic appointment.   There are no supporting services by either the sleep doctors or their staff on weekends and Holidays, or after 5 PM on weekdays.     PRESCRIPTIONS     We require 7 days advanced notice for prescription refills. If we do not receive the request in this time, we cannot guarantee that your medication will be refilled in time.    IMPORTANT PHONE NUMBERS     [facility]: [phone]  [facility] ([employer]): [phone]  [facility] (DME): [phone]  [facility] (DME): [phone]    CONTACTING YOUR SLEEP MEDICINE PROVIDER AND SLEEP TEAM      For issues with your machine or mask interface, please call your DME provider first. [email or web address] stands for durable medical company. [email or web address] is the company who provides you the machine and/or PAP supplies / accessories.   To schedule, cancel, or reschedule SLEEP STUDY APPOINTMENTS, please call the Main Phone Line at [phone] - option 3.   To schedule, cancel, or reschedule CLINIC APPOINTMENTS, you can do it in \"[employer]\", call [phone] for [facility] office to speak with my on site staff, or call the Main Phone Line at [phone] - option 2  For CLINICAL QUESTIONS or MEDICATION REFILLS, please call direct line for Adult Sleep Nurses at [phone].   Lastly, you can also send a message directly to your provider through \"My Chart\", which is the email service through your  Records Account: [email or web address]     Adult Sleep Nurses (Herminia Jones RN and Tootie Noel RN):  For clinical questions and refilling prescriptions: [phone]  Email sleep diaries and other documents at: [email or web address]    Office locations for Leah" Wil, NP:    [facility]   [address]   [address]  [place] [address] [phone]    [facility]  [address]  [address]  [place] [address] [phone]    OUR SLEEP TESTING LOCATIONS     Our team will contact you to schedule your sleep study, however, you can contact us as follow:  Main Phone Line (scheduling only): [phone]), option 3    Sleep Testing Locations:   [place] (18 years and older): [address] [place] [address], 2nd floor   [place] (18 years and older): [address]; 4th floor  After hours line: [phone]  [facility] (18 years and older) at [place]: [address] [place] [address]  After hours line: [phone]   [facility] (Main campus: All ages): [facility], 6th floor. After hours line: [phone]   [place] (5 years and older; younger considered on case-by-case basis): [address]; [address], [address]. Scheduling  After hours line: [phone]       Here at [facility], we wish you a restful sleep!    Your sleep medicine provider for this visit was: HINA Watson-CNP

## 2025-02-14 ENCOUNTER — OFFICE VISIT (OUTPATIENT)
Dept: PRIMARY CARE | Facility: CLINIC | Age: 60
End: 2025-02-14
Payer: MEDICARE

## 2025-02-14 ENCOUNTER — HOSPITAL ENCOUNTER (OUTPATIENT)
Dept: RADIOLOGY | Facility: CLINIC | Age: 60
Discharge: HOME | End: 2025-02-14
Payer: MEDICARE

## 2025-02-14 VITALS
WEIGHT: 222.8 LBS | HEIGHT: 67 IN | DIASTOLIC BLOOD PRESSURE: 64 MMHG | OXYGEN SATURATION: 95 % | SYSTOLIC BLOOD PRESSURE: 112 MMHG | HEART RATE: 94 BPM | BODY MASS INDEX: 34.97 KG/M2

## 2025-02-14 DIAGNOSIS — M54.50 ACUTE LEFT-SIDED LOW BACK PAIN WITHOUT SCIATICA: ICD-10-CM

## 2025-02-14 DIAGNOSIS — R10.9 FLANK PAIN: Primary | ICD-10-CM

## 2025-02-14 DIAGNOSIS — E66.01 SEVERE OBESITY (BMI 35.0-39.9) WITH COMORBIDITY (MULTI): ICD-10-CM

## 2025-02-14 LAB
POC APPEARANCE, URINE: CLEAR
POC BILIRUBIN, URINE: NEGATIVE
POC BLOOD, URINE: ABNORMAL
POC COLOR, URINE: YELLOW
POC GLUCOSE, URINE: NEGATIVE MG/DL
POC KETONES, URINE: NEGATIVE MG/DL
POC LEUKOCYTES, URINE: NEGATIVE
POC NITRITE,URINE: NEGATIVE
POC PH, URINE: 6.5 PH
POC PROTEIN, URINE: NEGATIVE MG/DL
POC SPECIFIC GRAVITY, URINE: 1.01
POC UROBILINOGEN, URINE: 0.2 EU/DL

## 2025-02-14 PROCEDURE — 81003 URINALYSIS AUTO W/O SCOPE: CPT | Performed by: INTERNAL MEDICINE

## 2025-02-14 PROCEDURE — 72120 X-RAY BEND ONLY L-S SPINE: CPT

## 2025-02-14 PROCEDURE — 3074F SYST BP LT 130 MM HG: CPT | Performed by: INTERNAL MEDICINE

## 2025-02-14 PROCEDURE — 99214 OFFICE O/P EST MOD 30 MIN: CPT | Performed by: INTERNAL MEDICINE

## 2025-02-14 PROCEDURE — 3078F DIAST BP <80 MM HG: CPT | Performed by: INTERNAL MEDICINE

## 2025-02-14 PROCEDURE — G2211 COMPLEX E/M VISIT ADD ON: HCPCS | Performed by: INTERNAL MEDICINE

## 2025-02-14 PROCEDURE — 3008F BODY MASS INDEX DOCD: CPT | Performed by: INTERNAL MEDICINE

## 2025-02-14 PROCEDURE — 1036F TOBACCO NON-USER: CPT | Performed by: INTERNAL MEDICINE

## 2025-02-14 RX ORDER — MELOXICAM 15 MG/1
15 TABLET ORAL DAILY
Qty: 14 TABLET | Refills: 0 | Status: SHIPPED | OUTPATIENT
Start: 2025-02-14 | End: 2026-02-14

## 2025-02-14 NOTE — PROGRESS NOTES
"Subjective   Patient ID: Ede Zurita is a 59 y.o. male who presents for left flank pain.    HPI     Ede denies pain  Per staff has been grunting and grabbing left side  Saw gastro 2/7 and had KUB and was negative for constipation and advised to see us    It is tender to touch  Otherwise acting fine    Flank pain started around that time    Review of Systems   All other systems reviewed and are negative.      Objective   /64 (BP Location: Right arm, Patient Position: Sitting, BP Cuff Size: Adult)   Pulse 94   Ht 1.689 m (5' 6.5\")   Wt 101 kg (222 lb 12.8 oz)   SpO2 95%   BMI 35.42 kg/m²     Physical Exam  Constitutional:       Appearance: Normal appearance.   Cardiovascular:      Rate and Rhythm: Normal rate and regular rhythm.      Heart sounds: Normal heart sounds. No murmur heard.     No gallop.   Pulmonary:      Effort: Pulmonary effort is normal. No respiratory distress.      Breath sounds: Normal breath sounds.   Abdominal:      General: There is no distension.      Palpations: Abdomen is soft.      Tenderness: There is no abdominal tenderness. There is left CVA tenderness. There is no right CVA tenderness or guarding.   Musculoskeletal:      Right lower leg: No edema.      Left lower leg: No edema.      Comments: Non tender over hip  Neg OSCAR of left side   Neurological:      Mental Status: He is alert.         Assessment/Plan   Problem List Items Addressed This Visit             ICD-10-CM    Severe obesity (BMI 35.0-39.9) with comorbidity (Multi) E66.01     Other Visit Diagnoses         Codes    Flank pain    -  Primary R10.9    Relevant Orders    POCT UA Automated manually resulted (Completed)    Urinalysis with Reflex Culture and Microscopic    Acute left-sided low back pain without sciatica     M54.50    Relevant Medications    meloxicam (Mobic) 15 mg tablet    Other Relevant Orders    XR lumbar spine 4+ views w flexion extension        Ua shows trace blood-lysed so will send UA micro-likeky " neg  Check xray  Trial 2 weeks mobic and update 2 weeks  Ede sees me in 3 weeks  May need PT

## 2025-02-14 NOTE — PATIENT INSTRUCTIONS
Ede's urine was normal.    He is tender over his left back and I think it is coming from this.  We are doing xrays today  I sent over meloxicam (anti-inflammatory) to take 1 tablet daily for 2 weeks  Please update me after this if it helped, if it returned or did nothing

## 2025-02-15 LAB
APPEARANCE UR: CLEAR
BACTERIA #/AREA URNS HPF: NORMAL /HPF
BACTERIA UR CULT: NORMAL
BILIRUB UR QL STRIP: NEGATIVE
COLOR UR: YELLOW
GLUCOSE UR QL STRIP: NEGATIVE
HGB UR QL STRIP: NEGATIVE
HYALINE CASTS #/AREA URNS LPF: NORMAL /LPF
KETONES UR QL STRIP: NEGATIVE
LEUKOCYTE ESTERASE UR QL STRIP: NEGATIVE
NITRITE UR QL STRIP: NEGATIVE
PH UR STRIP: 6.5 [PH] (ref 5–8)
PROT UR QL STRIP: NEGATIVE
RBC #/AREA URNS HPF: NORMAL /HPF
SERVICE CMNT-IMP: NORMAL
SP GR UR STRIP: 1 (ref 1–1.03)
SQUAMOUS #/AREA URNS HPF: NORMAL /HPF
WBC #/AREA URNS HPF: NORMAL /HPF

## 2025-02-26 ENCOUNTER — APPOINTMENT (OUTPATIENT)
Dept: BEHAVIORAL HEALTH | Facility: CLINIC | Age: 60
End: 2025-02-26
Payer: COMMERCIAL

## 2025-02-28 ENCOUNTER — OFFICE VISIT (OUTPATIENT)
Dept: BEHAVIORAL HEALTH | Facility: CLINIC | Age: 60
End: 2025-02-28
Payer: COMMERCIAL

## 2025-02-28 DIAGNOSIS — G24.01 DRUG-INDUCED OROFACIAL DYSKINESIA: ICD-10-CM

## 2025-02-28 DIAGNOSIS — F28 OTHER PSYCHOTIC DISORDER NOT DUE TO A SUBSTANCE OR KNOWN PHYSIOLOGICAL CONDITION (MULTI): ICD-10-CM

## 2025-02-28 DIAGNOSIS — F63.9 IMPULSE CONTROL DISORDER: ICD-10-CM

## 2025-02-28 DIAGNOSIS — F51.05 INSOMNIA RELATED TO ANOTHER MENTAL DISORDER: ICD-10-CM

## 2025-02-28 DIAGNOSIS — F84.0 AUTISM SPECTRUM DISORDER (HHS-HCC): ICD-10-CM

## 2025-02-28 DIAGNOSIS — G47.33 OSA (OBSTRUCTIVE SLEEP APNEA): ICD-10-CM

## 2025-02-28 DIAGNOSIS — Z09 PSYCHIATRIC FOLLOW-UP: ICD-10-CM

## 2025-02-28 DIAGNOSIS — F79 INTELLECTUAL DISABILITY: ICD-10-CM

## 2025-02-28 DIAGNOSIS — Z86.59 PSYCHIATRIC FOLLOW-UP: ICD-10-CM

## 2025-02-28 DIAGNOSIS — F33.8 OTHER RECURRENT DEPRESSIVE DISORDERS: ICD-10-CM

## 2025-02-28 DIAGNOSIS — F98.4 STEREOTYPIC MOVEMENT DISORDER: ICD-10-CM

## 2025-02-28 PROCEDURE — G2211 COMPLEX E/M VISIT ADD ON: HCPCS | Performed by: STUDENT IN AN ORGANIZED HEALTH CARE EDUCATION/TRAINING PROGRAM

## 2025-02-28 PROCEDURE — 99214 OFFICE O/P EST MOD 30 MIN: CPT | Performed by: STUDENT IN AN ORGANIZED HEALTH CARE EDUCATION/TRAINING PROGRAM

## 2025-02-28 RX ORDER — OLANZAPINE 7.5 MG/1
TABLET ORAL
Qty: 30 TABLET | Refills: 6 | Status: SHIPPED | OUTPATIENT
Start: 2025-02-28
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
## 2025-02-28 NOTE — PATIENT INSTRUCTIONS
AFTER VISIT SUMMARY      Date: 2/28/2025  Appointment with Psychiatrist - Dr. Bedolla      Reason for Visit:  -Routine follow-up/Medication management      Discussed during Appointment:   -Physical health, psychiatric/behavioral symptoms, daily functioning, new issues/concerns     -Treatment plan/management, including medication      Clinical Impression/Status Update:  -Report of challenging behavior and teeth chattering since going down on olanzapine.  --We will continue to monitor symptoms and see him again in 4 to 6 weeks.      -Risk/Benefit assessment:   Medical necessity for continued treatment with psychotropic medication:   There is no report of signs/symptoms consistent with medication-induced impairment in daily functioning. At this time, benefits of medication felt to outweigh potential risks. But we will continue to reassess need for psychotropic medication at regular 3 to 6 month intervals, or sooner as clinically indicated.      #Clinic Policies/Procedures  -Refills  Prescriptions will be sent to your pharmacy with enough refills to last until your next appointment. For established patients, we typically provide 6 refills for regular meds and 3 refills for controlled substances (e.g., ADHD stimulant medication, benzodiazepines such as lorazepam). We will not provide additional refills beyond that without having an appointment. You can schedule an appointment by sending a [employer] message or calling our office at [phone]. [employer]iors    -Paperwork Requests (e.g., [employer] for guardianship)  We ask that you please schedule an appointment if needing paperwork filled out by the doctor (e.g., [employer], FMLA form).  Please provide as much information as possible about your request and email the doctor any forms needing to be filled out prior to the appointment.       ===========================  INSTRUCTIONS/RECOMMENDATIONS  ===========================    #Medication   -Medication changes made today:    --We are increasing your olanzapine from 5 mg at bedtime to 7.5 mg at bedtime    >>For prior authorization issues at the pharmacy -> Call the office and ask to talk to Nurse Rangel.      If having technical Issues with Epic or Desk-> Please help us improve the Epic experience  To help identify issues needing to be fixed and improve patient care, please report any issues you experience with Epic or  Desk, such as difficulty connecting to video during virtual visits.  [phone]      #Follow-up  --Your next appointment is scheduled for 4/14/2025 at 2:00 pm (virtual visit with [employer])    *If having new/worsening symptoms, please send a Desk message or call the clinic ([phone]) to discuss being seen sooner   >>If unable to reach the office, send me an email at [email or web address]

## 2025-03-04 ENCOUNTER — APPOINTMENT (OUTPATIENT)
Dept: PRIMARY CARE | Facility: CLINIC | Age: 60
End: 2025-03-04
Payer: MEDICARE

## 2025-03-04 VITALS
BODY MASS INDEX: 33.84 KG/M2 | HEIGHT: 67 IN | HEART RATE: 85 BPM | OXYGEN SATURATION: 97 % | SYSTOLIC BLOOD PRESSURE: 100 MMHG | DIASTOLIC BLOOD PRESSURE: 70 MMHG | WEIGHT: 215.6 LBS

## 2025-03-04 DIAGNOSIS — Z12.5 SCREENING FOR PROSTATE CANCER: ICD-10-CM

## 2025-03-04 DIAGNOSIS — I10 BENIGN ESSENTIAL HYPERTENSION: ICD-10-CM

## 2025-03-04 DIAGNOSIS — E55.9 VITAMIN D DEFICIENCY: ICD-10-CM

## 2025-03-04 DIAGNOSIS — E78.5 HYPERLIPIDEMIA, UNSPECIFIED HYPERLIPIDEMIA TYPE: ICD-10-CM

## 2025-03-04 DIAGNOSIS — R53.83 OTHER FATIGUE: ICD-10-CM

## 2025-03-04 DIAGNOSIS — R73.09 ELEVATED GLUCOSE: ICD-10-CM

## 2025-03-04 DIAGNOSIS — Z00.00 ROUTINE GENERAL MEDICAL EXAMINATION AT HEALTH CARE FACILITY: ICD-10-CM

## 2025-03-04 DIAGNOSIS — M51.360 DEGENERATION OF INTERVERTEBRAL DISC OF LUMBAR REGION WITH DISCOGENIC BACK PAIN: Primary | ICD-10-CM

## 2025-03-04 PROCEDURE — 3078F DIAST BP <80 MM HG: CPT | Performed by: INTERNAL MEDICINE

## 2025-03-04 PROCEDURE — 3008F BODY MASS INDEX DOCD: CPT | Performed by: INTERNAL MEDICINE

## 2025-03-04 PROCEDURE — G2211 COMPLEX E/M VISIT ADD ON: HCPCS | Performed by: INTERNAL MEDICINE

## 2025-03-04 PROCEDURE — 1036F TOBACCO NON-USER: CPT | Performed by: INTERNAL MEDICINE

## 2025-03-04 PROCEDURE — 3074F SYST BP LT 130 MM HG: CPT | Performed by: INTERNAL MEDICINE

## 2025-03-04 PROCEDURE — 99214 OFFICE O/P EST MOD 30 MIN: CPT | Performed by: INTERNAL MEDICINE

## 2025-03-04 RX ORDER — CYCLOBENZAPRINE HCL 5 MG
5 TABLET ORAL NIGHTLY
Qty: 7 TABLET | Refills: 0 | Status: SHIPPED | OUTPATIENT
Start: 2025-03-04 | End: 2025-03-11

## 2025-03-04 RX ORDER — LIDOCAINE 50 MG/G
1 PATCH TOPICAL DAILY
Qty: 30 PATCH | Refills: 0 | Status: SHIPPED | OUTPATIENT
Start: 2025-03-04

## 2025-03-04 ASSESSMENT — ENCOUNTER SYMPTOMS
FEVER: 0
MYALGIAS: 0
WOUND: 0
VOMITING: 0
EYE PAIN: 0
NAUSEA: 0
CONSTIPATION: 0
CHILLS: 0
HEMATURIA: 0
DYSURIA: 0
RHINORRHEA: 0
PALPITATIONS: 0
SORE THROAT: 0
BLOOD IN STOOL: 0
SHORTNESS OF BREATH: 0
CHEST TIGHTNESS: 0
WHEEZING: 0
FREQUENCY: 0
COUGH: 0
ARTHRALGIAS: 1
DIARRHEA: 0
UNEXPECTED WEIGHT CHANGE: 0
POLYPHAGIA: 0
POLYDIPSIA: 0
DIZZINESS: 0
NERVOUS/ANXIOUS: 0
ABDOMINAL PAIN: 0
HEADACHES: 0
DYSPHORIC MOOD: 0

## 2025-03-04 NOTE — PATIENT INSTRUCTIONS
Let's try Lidocaine patch and muscle relaxer (flexeril) nightly for 1 week. If helps, we can continue. If doesn't help-we will stop.    Stop meloxicam since not helping    I am referring to physical therapy    If still not better, let me know and we will do a CT. If normal, then orthopedic referral.    Fasting labs ASAP    Followup 4 months

## 2025-03-04 NOTE — PROGRESS NOTES
"Subjective   Patient ID: Ede Zurita is a 59 y.o. male who presents for Back Pain (Was started on Meloxicam. Patient is still grunting but states that he is not in pain. //Staff at [facility] think that patient is still having pain. ).    HPI     Ede is here for followup  Mobic did not seem to make a difference  Still grunts and sometimes holds side still    BP doing well  Edema resolved    BP doing well    Review of Systems   Constitutional:  Negative for chills, fever and unexpected weight change.   HENT:  Negative for congestion, hearing loss, rhinorrhea and sore throat.    Eyes:  Negative for pain and visual disturbance.   Respiratory:  Negative for cough, chest tightness, shortness of breath and wheezing.    Cardiovascular:  Negative for chest pain and palpitations.   Gastrointestinal:  Negative for abdominal pain, blood in stool, constipation, diarrhea, nausea and vomiting.   Endocrine: Negative for cold intolerance, heat intolerance, polydipsia and polyphagia.   Genitourinary:  Negative for dysuria, frequency and hematuria.   Musculoskeletal:  Positive for arthralgias. Negative for myalgias.   Skin:  Negative for rash and wound.   Neurological:  Negative for dizziness, syncope and headaches.   Psychiatric/Behavioral:  Negative for dysphoric mood. The patient is not nervous/anxious.        Objective   /70 (BP Location: Left arm, Patient Position: Sitting, BP Cuff Size: Adult)   Pulse 85   Ht 1.689 m (5' 6.5\")   Wt 97.8 kg (215 lb 9.6 oz)   SpO2 97%   BMI 34.28 kg/m²     Physical Exam  Constitutional:       Appearance: Normal appearance.   Cardiovascular:      Rate and Rhythm: Normal rate and regular rhythm.      Heart sounds: Normal heart sounds. No murmur heard.     No gallop.   Pulmonary:      Effort: Pulmonary effort is normal. No respiratory distress.      Breath sounds: Normal breath sounds.   Abdominal:      General: There is no distension.      Palpations: Abdomen is soft.      Tenderness: " There is no abdominal tenderness. There is no guarding or rebound.      Comments: Still ttp over left paraspinal muscles   Musculoskeletal:      Right lower leg: No edema.      Left lower leg: No edema.   Neurological:      Mental Status: He is alert.         Assessment/Plan   Problem List Items Addressed This Visit             ICD-10-CM    Hyperlipidemia E78.5    Relevant Orders    Lipid Panel    Vitamin D deficiency E55.9    Relevant Orders    Vitamin D 25-Hydroxy,Total (for eval of Vitamin D levels)     Other Visit Diagnoses         Codes    Degeneration of intervertebral disc of lumbar region with discogenic back pain    -  Primary M51.360    Relevant Medications    lidocaine (Lidoderm) 5 % patch    cyclobenzaprine (Flexeril) 5 mg tablet    Routine general medical examination at health care facility     Z00.00    Screening for prostate cancer     Z12.5    Relevant Orders    Prostate Specific Antigen    Elevated glucose     R73.09    Relevant Orders    Hemoglobin A1C    Other fatigue     R53.83    Relevant Orders    TSH with reflex to Free T4 if abnormal            Flank pain  UA neg  -xray with DDD  -stop mobix  -trial lidocaine patch and flexeril  -if does not help-PT  Update 6 weeks after PT-if persists CT A/P     Severe MIRTA- seeing sleep medicine      COnstipation: on colace  -seeing GI     Unintentional weight loss: new diagnosis of CLL  -seeing heme and having workup--was advised not from CLL. 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  -had recent c-scope, CT head, CT C/A/P  -weight issues resolved  6/22-  said as needed     Lung nodule 6/24- stable     left hip pain: neg and resolved     Hypertriglyceridemia:      GERD: resolved with stopping ASA and higher PPI  -they saw [facility] had normal EGD recently  -avoid nsaids     Urinary urgency  -PSA WNL  -on flomax  -sees urology     Hypertension: controlled with hydrochlorothiazide    and metoprolol  -off norvasc- edema    Hyperlipidemia: on simvastatin, TG getting better      Moderate intellectual disability/dysthymic disorder/ ASD/stereotypic movement disorder  -sees / Angel  -on lexapro, olanzapine and trazodone     Insomnia: see above     Hx of gastric polyps on EGD 5/3/19     f/u 4 months. Labs now  ENT: Dr. Rowe  Eye doc: Dr. Tomas  GI: Dr. Saroj Sun       Health Maintenance  -Prostate Cancer Screenin/24 WNL  -Colonoscopy:3/15/22-repeat 5 years ([facility])  -Vaccinations: Pneumovax, tdap, shingrix.   Lives at [facility]

## 2025-03-05 ENCOUNTER — PATIENT MESSAGE (OUTPATIENT)
Dept: PRIMARY CARE | Facility: CLINIC | Age: 60
End: 2025-03-05
Payer: MEDICARE

## 2025-03-05 DIAGNOSIS — M51.360 DEGENERATION OF INTERVERTEBRAL DISC OF LUMBAR REGION WITH DISCOGENIC BACK PAIN: Primary | ICD-10-CM

## 2025-03-12 DIAGNOSIS — K59.09 CHRONIC CONSTIPATION: ICD-10-CM

## 2025-03-12 RX ORDER — POLYETHYLENE GLYCOL 3350 17 G/17G
17 POWDER, FOR SOLUTION ORAL 2 TIMES DAILY
Qty: 72 EACH | Refills: 1 | Status: SHIPPED | OUTPATIENT
Start: 2025-03-12

## 2025-03-20 ENCOUNTER — CLINICAL SUPPORT (OUTPATIENT)
Dept: SLEEP MEDICINE | Facility: CLINIC | Age: 60
End: 2025-03-20
Payer: MEDICARE

## 2025-03-20 VITALS — BODY MASS INDEX: 34.65 KG/M2 | HEIGHT: 66 IN | WEIGHT: 215.61 LBS

## 2025-03-20 DIAGNOSIS — G47.33 OSA (OBSTRUCTIVE SLEEP APNEA): ICD-10-CM

## 2025-03-21 NOTE — PROGRESS NOTES
[facility], [place] TECH NOTE:     Patient: Ede Zurita   MRN//AGE: [id number]  1965  59 y.o.   Technologist: John Lee   Room: 2   Service Date: 3/20/2025        Sleep Testing Location: [facility]    [place]: 18    TECHNOLOGIST SLEEP STUDY PROCEDURE NOTE:   This sleep study is being conducted according to the policies and procedures outlined by the AAS accreditation standards.  The sleep study procedure and processes involved during this appointment was explained to the patient/patient’s family, questions were answered. The patient/family verbalized understanding.      The patient is a 59 y.o. year old male scheduled for a Diagnostic PSG Split night with montage of:  PSG . he arrived for his appointment.      The study that was ultimately completed was a Diagnostic PSG Split night with montage of:  PSG w/ C-FLOW .    The full study Was completed.  Patient questionnaires completed?: yes     Consents signed? yes    Initial Fall Risk Screening:     Ede has not fallen in the last 6 months. his did not result in injury. Ede does not have a fear of falling. He does not need assistance with sitting, standing, or walking. he does not need assistance walking in his home. he does not need assistance in an unfamiliar setting. The patient is notusing an assistive device.     Brief Study observations: Pt presents as 60 y/o Male here for scheduled PSG/ Split.  Notes indicate Pt requires caretaker..   Study Observations:  Pt arrived on time, was w/ caregiver (employee from group home), was ambulatory w/o assistance, and appeared alert/ oriented to surroundings throughout interactions w/ sleep staff.  Pt has caregiver for assistance w/ autism spectrum and ADHD.  Throughout intervention window noted AHI > 10, accompanied by frequent arousals, frequent awakenings, frequent leg movements, moderate continuous snoring, periodic WASO, and fragmented sleep architecture.  Noted no abnormal behaviors throughout duration of  study.  Study Interventions:  Per Split Night protocol, administered CPAP therapy due to AHI > 15 observed during intervention window w/ at least 120 mins of recorded sleep.  Iinitiated titration w/ Respironics Nuance Pro Gel Frame Pillows Mask - Medium @ 5cmH2O of CPAP w/ humidity +3 per protocol.  Completed Titration w/ F&P Mynor Full Face - S/L @ 72mrK3N of CPAP w/ Cflex +2 & humidity +3.  Pressure changes were motivated by observed respiratory events, flow limitation, frequent arousals, and snoring.  Additional Notes:  None.    Deviation to order/protocol and reason: N/A      If PAP, which was preferred mask/pressure/mode: F&P Mynor Full Face - S/L @ 94nrP8G of CPAP w/ Cflex +2 & humidity +3      Other:None    After the procedure, the patient/family was informed to ensure followup with ordering clinician for testing results.      Technologist: John Lee

## 2025-03-26 DIAGNOSIS — G47.33 OSA (OBSTRUCTIVE SLEEP APNEA): Primary | ICD-10-CM

## 2025-03-31 DIAGNOSIS — K59.09 CHRONIC CONSTIPATION: ICD-10-CM

## 2025-03-31 RX ORDER — POLYETHYLENE GLYCOL 3350 17 G/17G
17 POWDER, FOR SOLUTION ORAL 2 TIMES DAILY
Qty: 72 EACH | Refills: 1 | Status: CANCELLED | OUTPATIENT
Start: 2025-03-31

## 2025-03-31 RX ORDER — POLYETHYLENE GLYCOL 3350 17 G/17G
17 POWDER, FOR SOLUTION ORAL 2 TIMES DAILY
Qty: 1020 G | Refills: 11 | Status: SHIPPED | OUTPATIENT
Start: 2025-03-31 | End: 2026-03-26

## 2025-04-07 PROBLEM — G24.01: Status: ACTIVE | Noted: 2025-04-07

## 2025-04-07 PROBLEM — F51.05 INSOMNIA RELATED TO ANOTHER MENTAL DISORDER: Status: ACTIVE | Noted: 2025-04-07

## 2025-04-07 PROBLEM — F33.8 OTHER RECURRENT DEPRESSIVE DISORDERS: Status: ACTIVE | Noted: 2025-04-07

## 2025-04-09 ENCOUNTER — PATIENT MESSAGE (OUTPATIENT)
Dept: PRIMARY CARE | Facility: CLINIC | Age: 60
End: 2025-04-09
Payer: MEDICARE

## 2025-04-09 DIAGNOSIS — R19.8 FIRMNESS OF ABDOMEN: Primary | ICD-10-CM

## 2025-04-10 ENCOUNTER — HOSPITAL ENCOUNTER (OUTPATIENT)
Dept: RADIOLOGY | Facility: CLINIC | Age: 60
Discharge: HOME | End: 2025-04-10
Payer: MEDICARE

## 2025-04-10 DIAGNOSIS — R19.8 FIRMNESS OF ABDOMEN: ICD-10-CM

## 2025-04-10 PROCEDURE — 74019 RADEX ABDOMEN 2 VIEWS: CPT

## 2025-04-11 ENCOUNTER — TELEPHONE (OUTPATIENT)
Dept: PRIMARY CARE | Facility: CLINIC | Age: 60
End: 2025-04-11
Payer: MEDICARE

## 2025-04-11 DIAGNOSIS — K59.00 CONSTIPATION, UNSPECIFIED CONSTIPATION TYPE: Primary | ICD-10-CM

## 2025-04-11 RX ORDER — SENNOSIDES 8.6 MG/1
1 TABLET ORAL DAILY
Qty: 30 TABLET | Refills: 11 | Status: SHIPPED | OUTPATIENT
Start: 2025-04-11 | End: 2026-04-11

## 2025-04-11 NOTE — TELEPHONE ENCOUNTER
----- Message from Emiliana GUIDO sent at 4/11/2025  3:48 PM EDT -----  Called and spoke with Trish states that he does bowel movements daily.   Taking Miralax BID   Colace 100mg once daily - has not had to hold this at all due to not having diarrhea.   Does not take anything PRN  ----- Message -----  From: Iza Sherman MD  Sent: 4/11/2025   3:23 PM EDT  To: Emiliana Muniz MA    His xray shows constipation--can you see what [facility] is currently doing for bowels and if he has prns

## 2025-04-14 ENCOUNTER — APPOINTMENT (OUTPATIENT)
Dept: BEHAVIORAL HEALTH | Facility: CLINIC | Age: 60
End: 2025-04-14
Payer: MEDICARE

## 2025-04-14 DIAGNOSIS — F28 OTHER PSYCHOTIC DISORDER NOT DUE TO A SUBSTANCE OR KNOWN PHYSIOLOGICAL CONDITION (MULTI): ICD-10-CM

## 2025-04-14 DIAGNOSIS — F79 INTELLECTUAL DISABILITY: ICD-10-CM

## 2025-04-14 DIAGNOSIS — Z09 PSYCHIATRIC FOLLOW-UP: ICD-10-CM

## 2025-04-14 DIAGNOSIS — G24.01 DRUG-INDUCED OROFACIAL DYSKINESIA: ICD-10-CM

## 2025-04-14 DIAGNOSIS — F41.3 OTHER MIXED ANXIETY DISORDERS: ICD-10-CM

## 2025-04-14 DIAGNOSIS — F39 MOOD DISORDER (CMS-HCC): ICD-10-CM

## 2025-04-14 DIAGNOSIS — F84.0 AUTISM SPECTRUM DISORDER (HHS-HCC): ICD-10-CM

## 2025-04-14 DIAGNOSIS — F51.05 INSOMNIA RELATED TO ANOTHER MENTAL DISORDER: ICD-10-CM

## 2025-04-14 DIAGNOSIS — F98.4 STEREOTYPIC MOVEMENT DISORDER: ICD-10-CM

## 2025-04-14 DIAGNOSIS — G47.33 OSA (OBSTRUCTIVE SLEEP APNEA): ICD-10-CM

## 2025-04-14 DIAGNOSIS — F63.9 IMPULSE CONTROL DISORDER: ICD-10-CM

## 2025-04-14 DIAGNOSIS — Z86.59 PSYCHIATRIC FOLLOW-UP: ICD-10-CM

## 2025-04-14 PROCEDURE — G2211 COMPLEX E/M VISIT ADD ON: HCPCS | Performed by: STUDENT IN AN ORGANIZED HEALTH CARE EDUCATION/TRAINING PROGRAM

## 2025-04-14 PROCEDURE — 99215 OFFICE O/P EST HI 40 MIN: CPT | Performed by: STUDENT IN AN ORGANIZED HEALTH CARE EDUCATION/TRAINING PROGRAM

## 2025-04-14 NOTE — PATIENT INSTRUCTIONS
AFTER VISIT SUMMARY      Date: 4/14/2025  Appointment with Psychiatrist - Dr. Bedolla      Reason for Visit:  -Routine follow-up/Medication management      Discussed during Appointment:   -Physical health, psychiatric/behavioral symptoms, daily functioning, new issues/concerns     -Treatment plan/management, including medication      Clinical Impression/Status Update:  Irritability has resolved and returned to his behavioral baseline according to staff. Possible jaw movement not significantly changed from prior per staff description. Patient is still not impaired in any way. No sleep disruptions reported.  --We will continue to monitor symptoms and see him again in 4 months.      -Risk/Benefit assessment:   Medical necessity for continued treatment with psychotropic medication:   There is no report of signs/symptoms consistent with medication-induced impairment in daily functioning. At this time, benefits of medication felt to outweigh potential risks. But we will continue to reassess need for psychotropic medication at regular 3 to 6 month intervals, or sooner as clinically indicated.      #Clinic Policies/Procedures  -Refills  Prescriptions will be sent to your pharmacy with enough refills to last until your next appointment. For established patients, we typically provide 6 refills for regular meds and 3 refills for controlled substances (e.g., ADHD stimulant medication, benzodiazepines such as lorazepam). We will not provide additional refills beyond that without having an appointment. You can schedule an appointment by sending a [employer] message or calling our office at [phone]. [employer]iors    -Paperwork Requests (e.g., [employer] for guardianship)  We ask that you please schedule an appointment if needing paperwork filled out by the doctor (e.g., [employer], FMLA form).  Please provide as much information as possible about your request and email the doctor any forms needing to be filled out prior to the  appointment.       ===========================  INSTRUCTIONS/RECOMMENDATIONS  ===========================    #Medication   -No medication changes made today  --Continue taking your psych medications the same as usual    --Refills will be sent to your pharmacy      >>For prior authorization issues at the pharmacy -> Call the office and ask to talk to Nurse Rangel.      If having technical Issues with Epic or [employer]-> Please help us improve the Epic experience  To help identify issues needing to be fixed and improve patient care, please report any issues you experience with Epic or [employer], such as difficulty connecting to video during virtual visits.  [phone]      #Follow-up  --Your next appointment is scheduled for 8/11/2025 at 3:00 pm (virtual visit with [facility])    *If having new/worsening symptoms, please send a [employer] message or call the clinic ([phone]) to discuss being seen sooner   >>If unable to reach the office, send me an email at [email or web address]
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
## 2025-04-15 ENCOUNTER — APPOINTMENT (OUTPATIENT)
Facility: CLINIC | Age: 60
End: 2025-04-15
Payer: COMMERCIAL

## 2025-05-30 DIAGNOSIS — N40.0 BPH WITHOUT URINARY OBSTRUCTION: ICD-10-CM

## 2025-05-30 RX ORDER — TAMSULOSIN HYDROCHLORIDE 0.4 MG/1
0.4 CAPSULE ORAL NIGHTLY
Qty: 22 CAPSULE | Refills: 10 | Status: SHIPPED | OUTPATIENT
Start: 2025-05-30

## 2025-06-02 DIAGNOSIS — N40.0 BPH WITHOUT URINARY OBSTRUCTION: ICD-10-CM

## 2025-06-02 RX ORDER — TAMSULOSIN HYDROCHLORIDE 0.4 MG/1
0.4 CAPSULE ORAL NIGHTLY
Qty: 22 CAPSULE | Refills: 10 | Status: SHIPPED | OUTPATIENT
Start: 2025-06-02

## 2025-06-05 NOTE — PROGRESS NOTES
Patient: Ede Zurita  : 1965 AGE: 59 y.o. SEX:male   MRN: [id number]   Provider: BENNETT Watson     Location [facility]   Service Date: 2025     PCP: Iza Sherman MD   Referred by: No ref. provider found          [facility]  Follow Up Visit Note      HISTORY OF PRESENT ILLNESS     Ede Zurita is a 59 y.o. male with a h/o MIRTA, hypertension, hyperlipidemia, TMJ, GERD, BPH, anxiety, depression, stereotypic movement disorder, intellectual disability, autism spectrum disorder, eczema, obesity who presents to [facility].    25: Patient presents with Trish (medical staff at [facility]) who aides in interview today. Received RePAP . Patient states he loves his CPAP and missed the machine when they took it away (due to noncompliance). Staff reports he has much less daytime sleepiness and does not nap as much since using the CPAP. Staff assists him in putting on every night and report they do rounds during the evenings. He is comfortable with Mynor FFM but does notice air leak. ----> must increase usage       25: First follow up since starting PAP therapy. Patient presents with Trish (medical staff at [facility]) who aides in interview today. Patient states he likes the CPAP machine. He is using a P10 NP mask. Staff has reported that he is mouth breathing and will remove mask after a couple of hours. He has recently been deemed noncompliant per [facility] report, Phil reports she was unaware letter was sent out but would like to know next steps to keep machine. ---> Repeat PSG for PAP restart due to not meeting initial compliance. Fit with F&P Vitera FFM medium, sample mask provided       10/24/2024: NPV with concerns of MIRTA management. Patient presents with Estiven (medical care coordinator at [facility] where he has resided for 20+ years). Most of history obtained from Estiven who reports he always  snores very loud and is tired throughout the day. ---> start APAP 5-20 CWP      SLEEP STUDY HISTORY (personally reviewed raw data such as interpretation report, data sheet, hypnogram, and titration table if available and applicable)  -HSAT 5/16/2024-showing severe MIRTA with AHI 3% 56.5, AHI 4% 44.8, SpO2 zeinab 68.7%.  SpO2 <= 88% for 52.3 minutes      ESS: 4    REVIEW OF SYSTEMS     All other systems have been reviewed and are negative.    ALLERGIES     No Known Allergies    MEDICATIONS     Current Outpatient Medications   Medication Sig Dispense Refill    acetaminophen (Tylenol) 325 mg tablet Take 2 tablets (650 mg) by mouth every 6 hours if needed for mild pain (1 - 3), moderate pain (4 - 6), fever (temp greater than 38.0 C) or headaches.      ammonium lactate (Amlactin) 12 % cream       diaper,brief,adult,disposable (Depend Real Fit Brief Men L/XL) misc 1 each if needed (incontinence). 96 each 11    docusate sodium (Colace) 100 mg capsule TAKE 1 CAP BY MOUTH ONCE DAILY - HOLD IF DIARRHEA OCCURS 30 capsule 10    dorzolamide-timoloL (Cosopt) 22.3-6.8 mg/mL ophthalmic solution Administer 1 drop into both eyes.      erythromycin (Romycin) 5 mg/gram (0.5 %) ophthalmic ointment Apply to both eyes.      escitalopram (Lexapro) 10 mg tablet Take 1 tablet (10mg) scheduled once daily at bedtime for mood [F39.0] *Take in addition to Escitalopram 20mg tablet for total daily dose of 30mg. 30 tablet 6    escitalopram (Lexapro) 20 mg tablet Take 1 tablet (20mg) scheduled once daily at bedtime for mood [F39.0] *Take in addition to Escitalopram 10mg tablet for total daily dose of 30mg. 30 tablet 6    Flintstones Complete, fe sulf, 10 mg iron tablet,chewable CHEW AND SWALLOW 1 TABLET BY MOUTH ONCE DAILY 30 tablet 10    hydroCHLOROthiazide (Microzide) 12.5 mg tablet TAKE 1 TAB BY MOUTH ONCE DAILY 30 tablet 10    ketoconazole (NIZOral) 2 % cream       latanoprost (Xalatan) 0.005 % ophthalmic solution 1 drop once daily at bedtime.       lidocaine (Lidoderm) 5 % patch Place 1 patch over 12 hours on the skin once daily. Apply to painful area 12 hours per day, remove for 12 hours. For 7 days and then as needed 30 patch 0    melatonin 5 mg tablet Take 2 tablets (10mg) scheduled once daily at bedtime for sleep [F51.05] 60 tablet 6    metoprolol tartrate (Lopressor) 50 mg tablet Take scheduled twice daily: 1 tablet (50mg) in the morning (7:00) and 1 tablet (50mg) in the evening (17:00) for anxiety/impulse control [F41.3/F63.9]  *Take blood pressure and pulse daily before administering medication. Hold and notify nursing if blood pressure less than 90/60 or pulse less than 60. 60 tablet 6    OLANZapine (ZyPREXA) 7.5 mg tablet Take 1 tablet (7.5mg) scheduled once daily at bedtime for psychosis/impulse control [F29/F63.9] 30 tablet 6    omeprazole (PriLOSEC) 40 mg DR capsule Take 1 capsule (40 mg) by mouth once daily.      pediatric multivitamin (Children's Multivitamin) tablet,chewable chewable tablet Chew 1 tablet once daily. CHEW AND SWALLOW 30 each 10    pimecrolimus (Elidel) 1 % cream Use twice a day around mouth and nose to rash. Once gone, can stop. 60 g 1    polyethylene glycol (Glycolax, Miralax) 17 gram/dose powder Mix 17 g of powder and drink 2 times a day. 1020 g 11    sennosides (senna) 8.6 mg tablet Take 1 tablet (8.6 mg) by mouth once daily. 30 tablet 11    simvastatin (Zocor) 20 mg tablet TAKE 1 TAB BY MOUTH ONCE DAILY 30 tablet 10    tamsulosin (Flomax) 0.4 mg 24 hr capsule Take 1 capsule (0.4 mg) by mouth once daily at bedtime. 22 capsule 10    traZODone (Desyrel) 100 mg tablet Take 1 tablet (100mg) scheduled once daily at bedtime for sleep [F51.05] *Also for PRN use: Can take 1 additional tablet (100mg), up to once daily, as needed for PRN difficulty falling asleep if still awake >45 min. 50 tablet 6    Vitamin D3 25 mcg (1,000 unit) tablet TAKE 1 TAB BY MOUTH ONCE DAILY 30 tablet 10     No current facility-administered medications for  "this visit.       PAST HISTORIES     PERTINENT PAST MEDICAL HISTORY: See HPI    PERTINENT PAST SURGICAL HISTORY for Sleep Medicine:  non-contributory    PERTINENT FAMILY HISTORY for Sleep Medicine:  Unknown    PERTINENT SOCIAL HISTORY:  He  reports that he has never smoked. He has never been exposed to tobacco smoke. He has never used smokeless tobacco. He reports that he does not drink alcohol and does not use drugs. He [facility] lives at [facility].    Active Problems, Allergy List, Medication List, and PMH/PSH/FH/Social Hx have been reviewed and reconciled in chart. No significant changes unless documented in the pertinent chart section. Updates made when necessary.     PHYSICAL EXAM     VITAL SIGNS: /79   Pulse 83   Resp 18   Ht 1.676 m (5' 6\")   Wt 99.8 kg (220 lb)   SpO2 95%   BMI 35.51 kg/m²     CURRENT WEIGHT:   Vitals:    06/12/25 1438   Weight: 99.8 kg (220 lb)       PREVIOUS WEIGHTS:  Wt Readings from Last 3 Encounters:   06/12/25 99.8 kg (220 lb)   03/20/25 97.8 kg (215 lb 9.8 oz)   03/04/25 97.8 kg (215 lb 9.6 oz)     Constitutional: Alert and oriented, cooperative, no obvious distress   HEENT: Non icteric or anemic, EOM WNL bilaterally   Neck: Supple, no JVD, no goiter, no adenopathy, no rigidity     RESULTS/DATA     No results found for: \"IRON\", \"TRANSFERRIN\", \"IRONSAT\", \"TIBC\", \"FERRITIN\"    Bicarbonate   Date Value Ref Range Status   01/13/2025 29 21 - 32 mmol/L Final       ASSESSMENT/PLAN     Mr. Zurita is a 59 y.o. male and He was referred to the [facility] for evaluation of MIRTA    Problem List, Orders, Assessment, Recommendations:    #MIRTA  -HSAT 5/16/2024-showing severe MIRTA with AHI 3% 56.5, AHI 4% 44.8, SpO2 zeinab 68.7%.  SpO2 <= 88% for 52.3 minutes  -Split PSG 3/20/2025-showing moderate MIRTA with RDI 3% 26.7, RDI 4% 16.4, SpO2 zeinab 80%.  Recommend CPAP 15 cm H2O with EPR 2.  Mask used was F&P Mynor FFM large  - Retrieved and personally reviewed " recent PAP adherence download data today. See HPI.  - poor compliance to PAP therapy; applies nightly but removes after a few hours? ---> Again discussed compliance requirements of 4hrs of usage on 70% of nights. Staff will continue to assist patient in putting mask on nightly and will do evening rounds around midnight and 4AM to make sure he has kept it on  -residual AHI slightly above goal 8.8 (previously 15.5) which is likely due to large air leak. Reports subjective benefit with use  - If necessary, may return to office with all equipment to troubleshoot mask leak  - continue current setting CPAP 15 CWP  - DME- [facility]  - diet, exercise, and weight loss were emphasized today in clinic, as were non-supine sleep, avoiding alcohol in the late evening, and driving or operating heavy machinery when sleepy. Patient verbalized understanding.     #DEPRESSION / ANXIETY/AUTISM  - denies any SI, HI or hallucinations   - currently on meds, well controlled  - defer management to PCP / Psychiatry     #HTN  BP Readings from Last 1 Encounters:   06/12/25 130/79     - doing well, asymptomatic, denies any headache, blurry vision, chest pain, palpitation, dizziness, lightheadedness, or syncopal episodes  - discussed at length the impact of untreated MIRTA and BP control  - supportive management: low salt DASH diet (less than 2000 mg sodium intake daily), moderate intensity aerobic exercise at least 30 minutes 5 days per week, reduce stress, quit smoking, limit alcohol, lose weight, and monitor BP once daily  - continue current management and follow-up with PCP     #Obesity  BMI Readings from Last 1 Encounters:   06/12/25 35.51 kg/m²     - Encouraged healthy weight loss via diet and exercise  - Weight loss can help in the long term treatment of MIRTA.  - Defer management to PCP     All of patient's questions were answered. He verbalizes understanding and agreement with my assessment and plan.    Disposition    Return to clinic in 3-4  months    I personally spent 30 minutes today (exclusive of procedures) providing care for this patient, including preparation, face to face time, EMR documentation and other services such as review of medical records, diagnostic results, patient education, counseling, and coordination of care.

## 2025-06-12 ENCOUNTER — APPOINTMENT (OUTPATIENT)
Facility: CLINIC | Age: 60
End: 2025-06-12
Payer: COMMERCIAL

## 2025-06-12 VITALS
OXYGEN SATURATION: 95 % | HEART RATE: 83 BPM | BODY MASS INDEX: 35.36 KG/M2 | RESPIRATION RATE: 18 BRPM | DIASTOLIC BLOOD PRESSURE: 79 MMHG | WEIGHT: 220 LBS | SYSTOLIC BLOOD PRESSURE: 130 MMHG | HEIGHT: 66 IN

## 2025-06-12 DIAGNOSIS — Z78.9 NONSMOKER: ICD-10-CM

## 2025-06-12 DIAGNOSIS — I10 BENIGN ESSENTIAL HYPERTENSION: ICD-10-CM

## 2025-06-12 DIAGNOSIS — E66.9 OBESITY (BMI 30-39.9): ICD-10-CM

## 2025-06-12 DIAGNOSIS — G47.33 OSA (OBSTRUCTIVE SLEEP APNEA): Primary | ICD-10-CM

## 2025-06-12 DIAGNOSIS — F84.0 AUTISM SPECTRUM DISORDER (HHS-HCC): ICD-10-CM

## 2025-06-12 PROCEDURE — 3078F DIAST BP <80 MM HG: CPT | Performed by: NURSE PRACTITIONER

## 2025-06-12 PROCEDURE — G8433 SCR FOR DEP NOT CPT DOC RSN: HCPCS | Performed by: NURSE PRACTITIONER

## 2025-06-12 PROCEDURE — G2211 COMPLEX E/M VISIT ADD ON: HCPCS | Performed by: NURSE PRACTITIONER

## 2025-06-12 PROCEDURE — 1036F TOBACCO NON-USER: CPT | Performed by: NURSE PRACTITIONER

## 2025-06-12 PROCEDURE — 99214 OFFICE O/P EST MOD 30 MIN: CPT | Performed by: NURSE PRACTITIONER

## 2025-06-12 PROCEDURE — 3008F BODY MASS INDEX DOCD: CPT | Performed by: NURSE PRACTITIONER

## 2025-06-12 PROCEDURE — 3075F SYST BP GE 130 - 139MM HG: CPT | Performed by: NURSE PRACTITIONER

## 2025-06-12 ASSESSMENT — SLEEP AND FATIGUE QUESTIONNAIRES
HOW LIKELY ARE YOU TO NOD OFF OR FALL ASLEEP WHILE WATCHING TV: SLIGHT CHANCE OF DOZING
HOW LIKELY ARE YOU TO NOD OFF OR FALL ASLEEP WHILE SITTING QUIETLY AFTER LUNCH WITHOUT ALCOHOL: WOULD NEVER DOZE
HOW LIKELY ARE YOU TO NOD OFF OR FALL ASLEEP WHILE SITTING AND READING: WOULD NEVER DOZE
ESS-CHAD TOTAL SCORE: 4
SITING INACTIVE IN A PUBLIC PLACE LIKE A CLASS ROOM OR A MOVIE THEATER: WOULD NEVER DOZE
HOW LIKELY ARE YOU TO NOD OFF OR FALL ASLEEP WHILE SITTING AND TALKING TO SOMEONE: WOULD NEVER DOZE
HOW LIKELY ARE YOU TO NOD OFF OR FALL ASLEEP IN A CAR, WHILE STOPPED FOR A FEW MINUTES IN TRAFFIC: WOULD NEVER DOZE
HOW LIKELY ARE YOU TO NOD OFF OR FALL ASLEEP WHILE LYING DOWN TO REST IN THE AFTERNOON WHEN CIRCUMSTANCES PERMIT: MODERATE CHANCE OF DOZING
HOW LIKELY ARE YOU TO NOD OFF OR FALL ASLEEP WHEN YOU ARE A PASSENGER IN A CAR FOR AN HOUR WITHOUT A BREAK: SLIGHT CHANCE OF DOZING

## 2025-06-12 ASSESSMENT — ENCOUNTER SYMPTOMS
OCCASIONAL FEELINGS OF UNSTEADINESS: 1
DEPRESSION: 0
LOSS OF SENSATION IN FEET: 1

## 2025-06-12 NOTE — PATIENT INSTRUCTIONS
[facility]  [place] [id number] [place] [address]       NAME: Ede Zurita   DATE: 06/12/25    Your Sleep Provider Today: BENNETT Watson  Your Primary Care Physician: Iza Sherman MD       DIAGNOSIS:   1. MIRTA (obstructive sleep apnea)            Thank you for coming to the Sleep Medicine Clinic today! Your sleep medicine provider today was: BENNETT Watson Below is a summary of your treatment plan, other important information, and our contact numbers:      TREATMENT PLAN     - Follow-up in 3-4 months.  - If not already done, sign up for 'My Chart' and send prescription requests or messages through this        IMPORTANT INFORMATION     Call 911 for medical emergencies.  Our offices are generally open from Monday-Friday, 9 am - 5 pm.  If you need to get in touch with me, you may either call me/my team (number is below) or you can use [employer].  If a referral for a test, for CPAP, or for another specialist was made, and you have not heard about scheduling this within a week, please call scheduling at [phone]).  If you are unable to make your appointment for clinic or an overnight study, kindly call the office at least 48 hours in advance to cancel and reschedule.  If you are on CPAP, please bring your device's card or the device to each clinic appointment.   There are no supporting services by either the sleep doctors or their staff on weekends and Holidays, or after 5 PM on weekdays.     PRESCRIPTIONS     We require 7 days advanced notice for prescription refills. If we do not receive the request in this time, we cannot guarantee that your medication will be refilled in time.    IMPORTANT PHONE NUMBERS     [facility]: [phone]  [employer] (DME): [phone]  [employer] (DME): [phone]  [facility] (DME): [phone]    CONTACTING YOUR SLEEP  "MEDICINE PROVIDER AND SLEEP TEAM      For issues with your machine or mask interface, please call your DME provider first. DME stands for durable medical company. DME is the company who provides you the machine and/or PAP supplies / accessories.   To schedule, cancel, or reschedule SLEEP STUDY APPOINTMENTS, please call the Main Phone Line at [phone] - option 3.   To schedule, cancel, or reschedule CLINIC APPOINTMENTS, you can do it in \"MyChart\", call [phone] for [facility] office to speak with my on site staff, or call the Main Phone Line at [phone] - option 2  For CLINICAL QUESTIONS or MEDICATION REFILLS, please call direct line for Adult Sleep Nurses at [phone].   Lastly, you can also send a message directly to your provider through \"My Chart\", which is the email service through your  Records Account: [email or web address]     Adult Sleep Nurses (Herminia Jones RN and Tootie Noel RN):  For clinical questions and refilling prescriptions: [phone]  Email sleep diaries and other documents at: [email or web address]    Office locations for Leah De La Torre NP:    [facility]   [address]  [place] [address] [phone]    [facility]  [address]  [address]  [place] [address] [phone]    OUR SLEEP TESTING LOCATIONS     Our team will contact you to schedule your sleep study, however, you can contact us as follow:  Main Phone Line (scheduling only): [phone]), option 3    Sleep Testing Locations:   [place] (18 years and older): [address] [place] [address], 2nd floor  [facility] (18 years and older): [address]; 4th floor  After hours line: [phone]  [facility] (18 years and older) at [place]: [address]  After hours line: [phone]   [facility] (Main campus: All ages): [address], 6th floor. After hours line: " [phone]   [place] (5 years and older; younger considered on case-by-case basis): [address] [facility]; [address], [address]. Scheduling  After hours line: [phone]       Here at [facility], we wish you a restful sleep!    Your sleep medicine provider for this visit was: Leah De La Torre, APRN-CNP

## 2025-07-16 NOTE — PROGRESS NOTES
Patient: Ede Zurita  : 1965 AGE: 59 y.o. SEX:male   MRN: [id number]   Provider: BENNETT Watson     Location [facility]   Service Date: 2025     PCP: Iza Sherman MD   Referred by: No ref. provider found          [facility]  Follow Up Visit Note      HISTORY OF PRESENT ILLNESS     Ede Zurita is a 59 y.o. male with a h/o MIRTA, hypertension, hyperlipidemia, TMJ, GERD, BPH, anxiety, depression, stereotypic movement disorder, intellectual disability, autism spectrum disorder, eczema, obesity who presents to [facility].    25: Patient presents with Trish for mask fitting/troubleshooting session. He brought all equipment to visit today, large air leak noted with P10 mask. ----> pt fit with F&P Solo NP (medium/large), sample mask provided         25: Patient presents with Trish (medical staff at [facility]) who aides in interview today. Received RePAP . Patient states he loves his CPAP and missed the machine when they took it away (due to noncompliance). Staff reports he has much less daytime sleepiness and does not nap as much since using the CPAP. Staff assists him in putting on every night and report they do rounds during the evenings. He is comfortable with Mynor FFM but does notice air leak. ----> must increase usage       25: First follow up since starting PAP therapy. Patient presents with Trish (medical staff at [facility]) who aides in interview today. Patient states he likes the CPAP machine. He is using a P10 NP mask. Staff has reported that he is mouth breathing and will remove mask after a couple of hours. He has recently been deemed noncompliant per [facility] report, Phil reports she was unaware letter was sent out but would like to know next steps to keep machine. ---> Repeat PSG for PAP restart due to not meeting initial compliance. Fit with F&P [place] FFM medium, sample mask  provided       10/24/2024: NPV with concerns of MIRTA management. Patient presents with Estiven (medical care coordinator at [facility] where he has resided for 20+ years). Most of history obtained from Estiven who reports he always snores very loud and is tired throughout the day. ---> start APAP 5-20 CWP      SLEEP STUDY HISTORY (personally reviewed raw data such as interpretation report, data sheet, hypnogram, and titration table if available and applicable)  -HSAT 5/16/2024-showing severe MIRTA with AHI 3% 56.5, AHI 4% 44.8, SpO2 zeinab 68.7%.  SpO2 <= 88% for 52.3 minutes      ESS: 3    REVIEW OF SYSTEMS     All other systems have been reviewed and are negative.    ALLERGIES     No Known Allergies    MEDICATIONS     Current Outpatient Medications   Medication Sig Dispense Refill    acetaminophen (Tylenol) 325 mg tablet Take 2 tablets (650 mg) by mouth every 6 hours if needed for mild pain (1 - 3), moderate pain (4 - 6), fever (temp greater than 38.0 C) or headaches.      ammonium lactate (Amlactin) 12 % cream       diaper,brief,adult,disposable (Depend Real Fit Brief Men L/XL) misc 1 each if needed (incontinence). 96 each 11    docusate sodium (Colace) 100 mg capsule TAKE 1 CAP BY MOUTH ONCE DAILY - HOLD IF DIARRHEA OCCURS 30 capsule 10    dorzolamide-timoloL (Cosopt) 22.3-6.8 mg/mL ophthalmic solution Administer 1 drop into both eyes.      erythromycin (Romycin) 5 mg/gram (0.5 %) ophthalmic ointment Apply to both eyes.      escitalopram (Lexapro) 10 mg tablet Take 1 tablet (10mg) scheduled once daily at bedtime for mood [F39.0] *Take in addition to Escitalopram 20mg tablet for total daily dose of 30mg. 30 tablet 6    escitalopram (Lexapro) 20 mg tablet Take 1 tablet (20mg) scheduled once daily at bedtime for mood [F39.0] *Take in addition to Escitalopram 10mg tablet for total daily dose of 30mg. 30 tablet 6    Flintstones Complete, fe sulf, 10 mg iron tablet,chewable CHEW AND SWALLOW 1 TABLET BY MOUTH ONCE DAILY  30 tablet 10    hydroCHLOROthiazide (Microzide) 12.5 mg tablet TAKE 1 TAB BY MOUTH ONCE DAILY 30 tablet 10    ketoconazole (NIZOral) 2 % cream       latanoprost (Xalatan) 0.005 % ophthalmic solution 1 drop once daily at bedtime.      lidocaine (Lidoderm) 5 % patch Place 1 patch over 12 hours on the skin once daily. Apply to painful area 12 hours per day, remove for 12 hours. For 7 days and then as needed 30 patch 0    melatonin 5 mg tablet Take 2 tablets (10mg) scheduled once daily at bedtime for sleep [F51.05] 60 tablet 6    metoprolol tartrate (Lopressor) 50 mg tablet Take scheduled twice daily: 1 tablet (50mg) in the morning (7:00) and 1 tablet (50mg) in the evening (17:00) for anxiety/impulse control [F41.3/F63.9]  *Take blood pressure and pulse daily before administering medication. Hold and notify nursing if blood pressure less than 90/60 or pulse less than 60. 60 tablet 6    OLANZapine (ZyPREXA) 7.5 mg tablet Take 1 tablet (7.5mg) scheduled once daily at bedtime for psychosis/impulse control [F29/F63.9] 30 tablet 6    omeprazole (PriLOSEC) 40 mg DR capsule Take 1 capsule (40 mg) by mouth once daily.      pediatric multivitamin (Children's Multivitamin) tablet,chewable chewable tablet Chew 1 tablet once daily. CHEW AND SWALLOW 30 each 10    pimecrolimus (Elidel) 1 % cream Use twice a day around mouth and nose to rash. Once gone, can stop. 60 g 1    polyethylene glycol (Glycolax, Miralax) 17 gram/dose powder Mix 17 g of powder and drink 2 times a day. 1020 g 11    sennosides (senna) 8.6 mg tablet Take 1 tablet (8.6 mg) by mouth once daily. 30 tablet 11    simvastatin (Zocor) 20 mg tablet TAKE 1 TAB BY MOUTH ONCE DAILY 30 tablet 10    tamsulosin (Flomax) 0.4 mg 24 hr capsule Take 1 capsule (0.4 mg) by mouth once daily at bedtime. 22 capsule 10    traZODone (Desyrel) 100 mg tablet Take 1 tablet (100mg) scheduled once daily at bedtime for sleep [F51.05] *Also for PRN use: Can take 1 additional tablet (100mg), up  "to once daily, as needed for PRN difficulty falling asleep if still awake >45 min. 50 tablet 6    Vitamin D3 25 mcg (1,000 unit) tablet TAKE 1 TAB BY MOUTH ONCE DAILY 30 tablet 10     No current facility-administered medications for this visit.       PAST HISTORIES     PERTINENT PAST MEDICAL HISTORY: See HPI    PERTINENT PAST SURGICAL HISTORY for Sleep Medicine:  non-contributory    PERTINENT FAMILY HISTORY for Sleep Medicine:  Unknown    PERTINENT SOCIAL HISTORY:  He  reports that he has never smoked. He has never been exposed to tobacco smoke. He has never used smokeless tobacco. He reports that he does not drink alcohol and does not use drugs. He currently lives at [facility].    Active Problems, Allergy List, Medication List, and PMH/PSH/FH/Social Hx have been reviewed and reconciled in chart. No significant changes unless documented in the pertinent chart section. Updates made when necessary.     PHYSICAL EXAM     VITAL SIGNS: /68   Pulse 79   Resp 18   Ht 1.702 m (5' 7\")   Wt 96.7 kg (213 lb 3.2 oz)   SpO2 95%   BMI 33.39 kg/m²     CURRENT WEIGHT:   Vitals:    07/17/25 1316   Weight: 96.7 kg (213 lb 3.2 oz)     PREVIOUS WEIGHTS:  Wt Readings from Last 3 Encounters:   07/17/25 96.7 kg (213 lb 3.2 oz)   06/12/25 99.8 kg (220 lb)   03/20/25 97.8 kg (215 lb 9.8 oz)     Constitutional: Alert and oriented, cooperative, no obvious distress   HEENT: Non icteric or anemic, EOM WNL bilaterally   Neck: Supple, no JVD, no goiter, no adenopathy, no rigidity     RESULTS/DATA     No results found for: \"IRON\", \"TRANSFERRIN\", \"IRONSAT\", \"TIBC\", \"FERRITIN\"    Bicarbonate   Date Value Ref Range Status   01/13/2025 29 21 - 32 mmol/L Final       ASSESSMENT/PLAN     Mr. Zurita is a 59 y.o. male and He was referred to the [facility] for evaluation of MIRTA    Problem List, Orders, Assessment, Recommendations:    #MIRTA  -HSAT 5/16/2024-showing severe MIRTA with AHI 3% 56.5, AHI 4% 44.8, SpO2 " zeinab 68.7%.  SpO2 <= 88% for 52.3 minutes  -Split PSG 3/20/2025-showing moderate MIRTA with RDI 3% 26.7, RDI 4% 16.4, SpO2 zeinab 80%.  Recommend CPAP 15 cm H2O with EPR 2.  Mask used was F&P Mynor FFM large  - Retrieved and personally reviewed recent PAP adherence download data today. See HPI.  - poor compliance to PAP therapy; applies nightly but removes after a few hours? ---> Again discussed compliance requirements of 4hrs of usage on 70% of nights. Staff will continue to assist patient in putting mask on nightly and will do evening rounds around midnight and 4AM to make sure he has kept it on  -residual AHI slightly above goal ~10 (previously 8.8 & 15.5) which is likely due to large air leak. Reports subjective benefit with use  - Mask fitting/troubleshooting session with myself & [facility] rep Mcconnell performed in office today. pt fit with F&P Solo NP (medium/large), sample mask provided. Mask fitting showed good seal in office   - continue current setting CPAP 15 CWP  - DME- [facility]  - diet, exercise, and weight loss were emphasized today in clinic, as were non-supine sleep, avoiding alcohol in the late evening, and driving or operating heavy machinery when sleepy. Patient verbalized understanding.     #DEPRESSION / ANXIETY/AUTISM  - denies any SI, HI or hallucinations   - currently on meds, well controlled  - defer management to PCP / Psychiatry     #HTN  BP Readings from Last 1 Encounters:   07/17/25 106/68     - doing well, asymptomatic, denies any headache, blurry vision, chest pain, palpitation, dizziness, lightheadedness, or syncopal episodes  - discussed at length the impact of untreated MIRTA and BP control  - supportive management: low salt DASH diet (less than 2000 mg sodium intake daily), moderate intensity aerobic exercise at least 30 minutes 5 days per week, reduce stress, quit smoking, limit alcohol, lose weight, and monitor BP once daily  - continue current management and follow-up with PCP     #Obesity  BMI  Readings from Last 1 Encounters:   07/17/25 33.39 kg/m²     - Encouraged healthy weight loss via diet and exercise  - Weight loss can help in the long term treatment of MIRTA.  - Defer management to PCP     All of patient's questions were answered. He verbalizes understanding and agreement with my assessment and plan.    Disposition    Follow up in 1-2 months

## 2025-07-17 ENCOUNTER — APPOINTMENT (OUTPATIENT)
Facility: CLINIC | Age: 60
End: 2025-07-17
Payer: MEDICARE

## 2025-07-17 VITALS
WEIGHT: 213.2 LBS | HEART RATE: 79 BPM | OXYGEN SATURATION: 95 % | RESPIRATION RATE: 18 BRPM | DIASTOLIC BLOOD PRESSURE: 68 MMHG | SYSTOLIC BLOOD PRESSURE: 106 MMHG | HEIGHT: 67 IN | BODY MASS INDEX: 33.46 KG/M2

## 2025-07-17 DIAGNOSIS — I10 BENIGN ESSENTIAL HYPERTENSION: ICD-10-CM

## 2025-07-17 DIAGNOSIS — G47.33 OSA (OBSTRUCTIVE SLEEP APNEA): Primary | ICD-10-CM

## 2025-07-17 DIAGNOSIS — E66.9 OBESITY (BMI 30-39.9): ICD-10-CM

## 2025-07-17 DIAGNOSIS — Z78.9 NONSMOKER: ICD-10-CM

## 2025-07-17 PROCEDURE — 3008F BODY MASS INDEX DOCD: CPT | Performed by: NURSE PRACTITIONER

## 2025-07-17 PROCEDURE — 3078F DIAST BP <80 MM HG: CPT | Performed by: NURSE PRACTITIONER

## 2025-07-17 PROCEDURE — G8433 SCR FOR DEP NOT CPT DOC RSN: HCPCS | Performed by: NURSE PRACTITIONER

## 2025-07-17 PROCEDURE — 1036F TOBACCO NON-USER: CPT | Performed by: NURSE PRACTITIONER

## 2025-07-17 PROCEDURE — 99213 OFFICE O/P EST LOW 20 MIN: CPT | Performed by: NURSE PRACTITIONER

## 2025-07-17 PROCEDURE — G2211 COMPLEX E/M VISIT ADD ON: HCPCS | Performed by: NURSE PRACTITIONER

## 2025-07-17 PROCEDURE — 3074F SYST BP LT 130 MM HG: CPT | Performed by: NURSE PRACTITIONER

## 2025-07-17 ASSESSMENT — PATIENT HEALTH QUESTIONNAIRE - PHQ9
2. FEELING DOWN, DEPRESSED OR HOPELESS: NOT AT ALL
1. LITTLE INTEREST OR PLEASURE IN DOING THINGS: NOT AT ALL
SUM OF ALL RESPONSES TO PHQ9 QUESTIONS 1 AND 2: 0

## 2025-07-17 ASSESSMENT — SLEEP AND FATIGUE QUESTIONNAIRES
HOW LIKELY ARE YOU TO NOD OFF OR FALL ASLEEP WHILE WATCHING TV: SLIGHT CHANCE OF DOZING
HOW LIKELY ARE YOU TO NOD OFF OR FALL ASLEEP WHEN YOU ARE A PASSENGER IN A CAR FOR AN HOUR WITHOUT A BREAK: SLIGHT CHANCE OF DOZING
SITING INACTIVE IN A PUBLIC PLACE LIKE A CLASS ROOM OR A MOVIE THEATER: WOULD NEVER DOZE
HOW LIKELY ARE YOU TO NOD OFF OR FALL ASLEEP IN A CAR, WHILE STOPPED FOR A FEW MINUTES IN TRAFFIC: WOULD NEVER DOZE
ESS-CHAD TOTAL SCORE: 3
HOW LIKELY ARE YOU TO NOD OFF OR FALL ASLEEP WHILE SITTING QUIETLY AFTER LUNCH WITHOUT ALCOHOL: WOULD NEVER DOZE
HOW LIKELY ARE YOU TO NOD OFF OR FALL ASLEEP WHILE LYING DOWN TO REST IN THE AFTERNOON WHEN CIRCUMSTANCES PERMIT: SLIGHT CHANCE OF DOZING
HOW LIKELY ARE YOU TO NOD OFF OR FALL ASLEEP WHILE SITTING AND READING: WOULD NEVER DOZE
HOW LIKELY ARE YOU TO NOD OFF OR FALL ASLEEP WHILE SITTING AND TALKING TO SOMEONE: WOULD NEVER DOZE

## 2025-07-17 ASSESSMENT — ENCOUNTER SYMPTOMS
LOSS OF SENSATION IN FEET: 0
OCCASIONAL FEELINGS OF UNSTEADINESS: 0
DEPRESSION: 0

## 2025-07-17 ASSESSMENT — COLUMBIA-SUICIDE SEVERITY RATING SCALE - C-SSRS
2. HAVE YOU ACTUALLY HAD ANY THOUGHTS OF KILLING YOURSELF?: NO
6. HAVE YOU EVER DONE ANYTHING, STARTED TO DO ANYTHING, OR PREPARED TO DO ANYTHING TO END YOUR LIFE?: NO
1. IN THE PAST MONTH, HAVE YOU WISHED YOU WERE DEAD OR WISHED YOU COULD GO TO SLEEP AND NOT WAKE UP?: NO

## 2025-07-21 ENCOUNTER — APPOINTMENT (OUTPATIENT)
Dept: PRIMARY CARE | Facility: CLINIC | Age: 60
End: 2025-07-21
Payer: MEDICARE

## 2025-07-21 VITALS
HEART RATE: 74 BPM | HEIGHT: 67 IN | WEIGHT: 214.6 LBS | DIASTOLIC BLOOD PRESSURE: 68 MMHG | BODY MASS INDEX: 33.68 KG/M2 | SYSTOLIC BLOOD PRESSURE: 104 MMHG | OXYGEN SATURATION: 95 %

## 2025-07-21 DIAGNOSIS — E78.5 HYPERLIPIDEMIA, UNSPECIFIED HYPERLIPIDEMIA TYPE: ICD-10-CM

## 2025-07-21 DIAGNOSIS — M51.360 DEGENERATION OF INTERVERTEBRAL DISC OF LUMBAR REGION WITH DISCOGENIC BACK PAIN: ICD-10-CM

## 2025-07-21 DIAGNOSIS — I10 BENIGN ESSENTIAL HYPERTENSION: Primary | ICD-10-CM

## 2025-07-21 PROCEDURE — 1036F TOBACCO NON-USER: CPT | Performed by: INTERNAL MEDICINE

## 2025-07-21 PROCEDURE — 99213 OFFICE O/P EST LOW 20 MIN: CPT | Performed by: INTERNAL MEDICINE

## 2025-07-21 PROCEDURE — 3074F SYST BP LT 130 MM HG: CPT | Performed by: INTERNAL MEDICINE

## 2025-07-21 PROCEDURE — 3078F DIAST BP <80 MM HG: CPT | Performed by: INTERNAL MEDICINE

## 2025-07-21 PROCEDURE — 3008F BODY MASS INDEX DOCD: CPT | Performed by: INTERNAL MEDICINE

## 2025-07-21 PROCEDURE — G2211 COMPLEX E/M VISIT ADD ON: HCPCS | Performed by: INTERNAL MEDICINE

## 2025-07-21 RX ORDER — LIDOCAINE 50 MG/G
1 PATCH TOPICAL DAILY PRN
Qty: 30 PATCH | Refills: 2 | Status: SHIPPED | OUTPATIENT
Start: 2025-07-21

## 2025-07-21 RX ORDER — KETOCONAZOLE 20 MG/ML
SHAMPOO, SUSPENSION TOPICAL 3 TIMES WEEKLY
COMMUNITY

## 2025-07-21 ASSESSMENT — ENCOUNTER SYMPTOMS
CONSTIPATION: 0
ABDOMINAL PAIN: 0
DIZZINESS: 0
COUGH: 0
POLYDIPSIA: 0
POLYPHAGIA: 0
DIARRHEA: 0
ARTHRALGIAS: 0
WHEEZING: 0
HEADACHES: 0
BACK PAIN: 1
DYSPHORIC MOOD: 0
SORE THROAT: 0
FEVER: 0
PALPITATIONS: 0
VOMITING: 0
DYSURIA: 0
RHINORRHEA: 0
CHEST TIGHTNESS: 0
FREQUENCY: 0
WOUND: 0
HEMATURIA: 0
CHILLS: 0
BLOOD IN STOOL: 0
SHORTNESS OF BREATH: 0
NAUSEA: 0
MYALGIAS: 0
UNEXPECTED WEIGHT CHANGE: 0
NERVOUS/ANXIOUS: 0
EYE PAIN: 0

## 2025-07-21 NOTE — PROGRESS NOTES
"Subjective   Patient ID: Ede Zurita is a 59 y.o. male who presents for 4 month follow-up.    HPI       Here for followup  Back pain went away but back again  Never did PT  The lidocaine patches helped    His bowels are moving better with Linzess  Saw GI    His BP readings are normal  Review of Systems   Constitutional:  Negative for chills, fever and unexpected weight change.   HENT:  Negative for congestion, hearing loss, rhinorrhea and sore throat.    Eyes:  Negative for pain and visual disturbance.   Respiratory:  Negative for cough, chest tightness, shortness of breath and wheezing.    Cardiovascular:  Negative for chest pain and palpitations.   Gastrointestinal:  Negative for abdominal pain, blood in stool, constipation, diarrhea, nausea and vomiting.   Endocrine: Negative for cold intolerance, heat intolerance, polydipsia and polyphagia.   Genitourinary:  Negative for dysuria, frequency and hematuria.   Musculoskeletal:  Positive for back pain. Negative for arthralgias and myalgias.   Skin:  Negative for rash and wound.   Neurological:  Negative for dizziness, syncope and headaches.   Psychiatric/Behavioral:  Negative for dysphoric mood. The patient is not nervous/anxious.        Objective   /68 (BP Location: Left arm, Patient Position: Sitting, BP Cuff Size: Large adult)   Pulse 74   Ht 1.702 m (5' 7.01\")   Wt 97.3 kg (214 lb 9.6 oz)   SpO2 95%   BMI 33.60 kg/m²     Physical Exam  Constitutional:       Appearance: Normal appearance.     Cardiovascular:      Rate and Rhythm: Normal rate and regular rhythm.      Heart sounds: Normal heart sounds. No murmur heard.     No gallop.   Pulmonary:      Effort: Pulmonary effort is normal. No respiratory distress.      Breath sounds: Normal breath sounds.     Musculoskeletal:      Right lower leg: No edema.      Left lower leg: No edema.     Neurological:      Mental Status: He is alert.         Assessment/Plan   Problem List Items Addressed This Visit        "    ICD-10-CM    Benign essential hypertension - Primary I10    Hyperlipidemia E78.5     Other Visit Diagnoses         Codes      Degeneration of intervertebral disc of lumbar region with discogenic back pain     M51.360    Relevant Medications    lidocaine (Lidoderm) 5 % patch             Severe MIRTA- seeing sleep medicine      COnstipation: on colace, miralax and zenna. Saw GI and given Linzess  -seeing GI     Unintentional weight loss: new diagnosis of CLL  -seeing heme and having workup--was advised not from CLL. 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  -had recent c-scope, CT head, CT C/A/P  -weight issues resolved  -  said as needed     Lung nodule - stable     left hip pain: neg and resolved     Hypertriglyceridemia:      GERD: resolved with stopping ASA and higher PPI  -they saw [facility] had normal EGD recently  -avoid nsaids     Urinary urgency  -PSA WNL  -on flomax  -sees urology     Hypertension: controlled with hydrochlorothiazide   and metoprolol  -off norvasc- edema     Hyperlipidemia: on simvastatin, TG getting better      Moderate intellectual disability/dysthymic disorder/ ASD/stereotypic movement disorder  -sees / Angel  -on lexapro, olanzapine and trazodone     Insomnia: see above     Hx of gastric polyps on EGD 5/3/19     f/u 4 months. Labs now  ENT: Dr. Rowe  Eye doc: Dr. Tomas  GI: Dr. Saroj Sun        Health Maintenance  -Prostate Cancer Screenin/24 WNL  -Colonoscopy:3/15/22-repeat 5 years ([facility])  -Vaccinations: Pneumovax, tdap, shingrix.   L[facility] at [facility]

## 2025-07-21 NOTE — PATIENT INSTRUCTIONS
Ede is due for labs    I wrote the lidocaine patch as needed. If back pain is continuing, I would do the physical therapy

## 2025-08-11 ENCOUNTER — APPOINTMENT (OUTPATIENT)
Dept: BEHAVIORAL HEALTH | Facility: CLINIC | Age: 60
End: 2025-08-11
Payer: MEDICARE

## 2025-08-11 DIAGNOSIS — F39 MOOD DISORDER: ICD-10-CM

## 2025-08-11 DIAGNOSIS — F99 INSOMNIA DUE TO OTHER MENTAL DISORDER: ICD-10-CM

## 2025-08-11 DIAGNOSIS — F63.9 IMPULSE CONTROL DISORDER: ICD-10-CM

## 2025-08-11 DIAGNOSIS — F28 OTHER PSYCHOTIC DISORDER NOT DUE TO A SUBSTANCE OR KNOWN PHYSIOLOGICAL CONDITION (MULTI): ICD-10-CM

## 2025-08-11 DIAGNOSIS — Z86.59 PSYCHIATRIC FOLLOW-UP: ICD-10-CM

## 2025-08-11 DIAGNOSIS — F51.05 INSOMNIA DUE TO OTHER MENTAL DISORDER: ICD-10-CM

## 2025-08-11 DIAGNOSIS — Z09 PSYCHIATRIC FOLLOW-UP: ICD-10-CM

## 2025-08-11 DIAGNOSIS — F41.3 OTHER MIXED ANXIETY DISORDERS: ICD-10-CM

## 2025-08-11 PROCEDURE — G2211 COMPLEX E/M VISIT ADD ON: HCPCS | Performed by: STUDENT IN AN ORGANIZED HEALTH CARE EDUCATION/TRAINING PROGRAM

## 2025-08-11 PROCEDURE — 99214 OFFICE O/P EST MOD 30 MIN: CPT | Performed by: STUDENT IN AN ORGANIZED HEALTH CARE EDUCATION/TRAINING PROGRAM

## 2025-08-21 RX ORDER — METOPROLOL TARTRATE 50 MG/1
TABLET ORAL
Qty: 60 TABLET | Refills: 6 | Status: SHIPPED | OUTPATIENT
Start: 2025-08-21

## 2025-08-21 RX ORDER — OLANZAPINE 7.5 MG/1
TABLET, FILM COATED ORAL
Qty: 30 TABLET | Refills: 6 | Status: SHIPPED | OUTPATIENT
Start: 2025-08-21

## 2025-08-21 RX ORDER — TRAZODONE HYDROCHLORIDE 100 MG/1
TABLET ORAL
Qty: 50 TABLET | Refills: 6 | Status: SHIPPED | OUTPATIENT
Start: 2025-08-21

## 2025-08-21 RX ORDER — ACETAMINOPHEN 500 MG
TABLET ORAL
Qty: 60 TABLET | Refills: 6 | Status: SHIPPED | OUTPATIENT
Start: 2025-08-21

## 2025-08-21 RX ORDER — ESCITALOPRAM OXALATE 10 MG/1
TABLET ORAL
Qty: 30 TABLET | Refills: 6 | Status: SHIPPED | OUTPATIENT
Start: 2025-08-21

## 2025-08-21 RX ORDER — ESCITALOPRAM OXALATE 20 MG/1
TABLET ORAL
Qty: 30 TABLET | Refills: 6 | Status: SHIPPED | OUTPATIENT
Start: 2025-08-21

## 2025-08-26 ENCOUNTER — RESULTS FOLLOW-UP (OUTPATIENT)
Dept: PRIMARY CARE | Facility: CLINIC | Age: 60
End: 2025-08-26
Payer: MEDICARE

## 2025-08-26 DIAGNOSIS — E78.5 HYPERLIPIDEMIA, UNSPECIFIED HYPERLIPIDEMIA TYPE: Primary | ICD-10-CM

## 2025-08-26 LAB
25(OH)D3+25(OH)D2 SERPL-MCNC: 58 NG/ML (ref 30–100)
CHOLEST SERPL-MCNC: 143 MG/DL
CHOLEST/HDLC SERPL: 5.1 (CALC)
EST. AVERAGE GLUCOSE BLD GHB EST-MCNC: 105 MG/DL
EST. AVERAGE GLUCOSE BLD GHB EST-SCNC: 5.8 MMOL/L
HBA1C MFR BLD: 5.3 %
HDLC SERPL-MCNC: 28 MG/DL
LDLC SERPL CALC-MCNC: ABNORMAL MG/DL
NONHDLC SERPL-MCNC: 115 MG/DL (CALC)
PSA SERPL-MCNC: 0.94 NG/ML
TRIGL SERPL-MCNC: 530 MG/DL
TSH SERPL-ACNC: 4.02 MIU/L (ref 0.4–4.5)

## 2025-08-26 RX ORDER — FENOFIBRATE 145 MG/1
145 TABLET, FILM COATED ORAL DAILY
Qty: 30 TABLET | Refills: 5 | Status: SHIPPED | OUTPATIENT
Start: 2025-08-26 | End: 2026-02-22

## 2025-10-14 ENCOUNTER — APPOINTMENT (OUTPATIENT)
Facility: CLINIC | Age: 60
End: 2025-10-14
Payer: MEDICARE

## 2025-11-07 ENCOUNTER — APPOINTMENT (OUTPATIENT)
Dept: PRIMARY CARE | Facility: CLINIC | Age: 60
End: 2025-11-07
Payer: MEDICARE

## 2025-12-03 ENCOUNTER — APPOINTMENT (OUTPATIENT)
Dept: BEHAVIORAL HEALTH | Facility: CLINIC | Age: 60
End: 2025-12-03
Payer: MEDICARE